# Patient Record
Sex: FEMALE | Race: WHITE | Employment: FULL TIME | URBAN - METROPOLITAN AREA
[De-identification: names, ages, dates, MRNs, and addresses within clinical notes are randomized per-mention and may not be internally consistent; named-entity substitution may affect disease eponyms.]

---

## 2021-08-10 ENCOUNTER — OFFICE VISIT (OUTPATIENT)
Dept: URGENT CARE | Facility: CLINIC | Age: 31
End: 2021-08-10
Payer: COMMERCIAL

## 2021-08-10 VITALS
DIASTOLIC BLOOD PRESSURE: 80 MMHG | HEART RATE: 88 BPM | BODY MASS INDEX: 27.31 KG/M2 | OXYGEN SATURATION: 99 % | RESPIRATION RATE: 16 BRPM | WEIGHT: 160 LBS | SYSTOLIC BLOOD PRESSURE: 115 MMHG | HEIGHT: 64 IN | TEMPERATURE: 98 F

## 2021-08-10 DIAGNOSIS — R05.9 COUGH: ICD-10-CM

## 2021-08-10 DIAGNOSIS — R09.81 NASAL CONGESTION: ICD-10-CM

## 2021-08-10 DIAGNOSIS — Z20.822 EXPOSURE TO COVID-19 VIRUS: Primary | ICD-10-CM

## 2021-08-10 LAB
CTP QC/QA: YES
SARS-COV-2 RDRP RESP QL NAA+PROBE: NEGATIVE

## 2021-08-10 PROCEDURE — 3079F DIAST BP 80-89 MM HG: CPT | Mod: CPTII,S$GLB,, | Performed by: PHYSICIAN ASSISTANT

## 2021-08-10 PROCEDURE — 3008F PR BODY MASS INDEX (BMI) DOCUMENTED: ICD-10-PCS | Mod: CPTII,S$GLB,, | Performed by: PHYSICIAN ASSISTANT

## 2021-08-10 PROCEDURE — 1160F RVW MEDS BY RX/DR IN RCRD: CPT | Mod: CPTII,S$GLB,, | Performed by: PHYSICIAN ASSISTANT

## 2021-08-10 PROCEDURE — U0002: ICD-10-PCS | Mod: QW,S$GLB,, | Performed by: PHYSICIAN ASSISTANT

## 2021-08-10 PROCEDURE — 1160F PR REVIEW ALL MEDS BY PRESCRIBER/CLIN PHARMACIST DOCUMENTED: ICD-10-PCS | Mod: CPTII,S$GLB,, | Performed by: PHYSICIAN ASSISTANT

## 2021-08-10 PROCEDURE — 99203 PR OFFICE/OUTPT VISIT, NEW, LEVL III, 30-44 MIN: ICD-10-PCS | Mod: S$GLB,,, | Performed by: PHYSICIAN ASSISTANT

## 2021-08-10 PROCEDURE — 3079F PR MOST RECENT DIASTOLIC BLOOD PRESSURE 80-89 MM HG: ICD-10-PCS | Mod: CPTII,S$GLB,, | Performed by: PHYSICIAN ASSISTANT

## 2021-08-10 PROCEDURE — 1159F MED LIST DOCD IN RCRD: CPT | Mod: CPTII,S$GLB,, | Performed by: PHYSICIAN ASSISTANT

## 2021-08-10 PROCEDURE — 99203 OFFICE O/P NEW LOW 30 MIN: CPT | Mod: S$GLB,,, | Performed by: PHYSICIAN ASSISTANT

## 2021-08-10 PROCEDURE — 3074F PR MOST RECENT SYSTOLIC BLOOD PRESSURE < 130 MM HG: ICD-10-PCS | Mod: CPTII,S$GLB,, | Performed by: PHYSICIAN ASSISTANT

## 2021-08-10 PROCEDURE — U0002 COVID-19 LAB TEST NON-CDC: HCPCS | Mod: QW,S$GLB,, | Performed by: PHYSICIAN ASSISTANT

## 2021-08-10 PROCEDURE — 3074F SYST BP LT 130 MM HG: CPT | Mod: CPTII,S$GLB,, | Performed by: PHYSICIAN ASSISTANT

## 2021-08-10 PROCEDURE — 1159F PR MEDICATION LIST DOCUMENTED IN MEDICAL RECORD: ICD-10-PCS | Mod: CPTII,S$GLB,, | Performed by: PHYSICIAN ASSISTANT

## 2021-08-10 PROCEDURE — 3008F BODY MASS INDEX DOCD: CPT | Mod: CPTII,S$GLB,, | Performed by: PHYSICIAN ASSISTANT

## 2021-08-10 RX ORDER — FLUOXETINE HYDROCHLORIDE 20 MG/1
20 CAPSULE ORAL DAILY
COMMUNITY

## 2022-11-11 ENCOUNTER — OFFICE VISIT (OUTPATIENT)
Dept: URGENT CARE | Facility: CLINIC | Age: 32
End: 2022-11-11
Payer: COMMERCIAL

## 2022-11-11 VITALS
TEMPERATURE: 99 F | WEIGHT: 150 LBS | BODY MASS INDEX: 24.99 KG/M2 | RESPIRATION RATE: 17 BRPM | OXYGEN SATURATION: 97 % | SYSTOLIC BLOOD PRESSURE: 116 MMHG | HEIGHT: 65 IN | DIASTOLIC BLOOD PRESSURE: 74 MMHG | HEART RATE: 90 BPM

## 2022-11-11 DIAGNOSIS — B96.89 ACUTE BACTERIAL TONSILLITIS: ICD-10-CM

## 2022-11-11 DIAGNOSIS — J03.80 ACUTE BACTERIAL TONSILLITIS: ICD-10-CM

## 2022-11-11 DIAGNOSIS — J02.9 SORE THROAT: Primary | ICD-10-CM

## 2022-11-11 LAB
CTP QC/QA: YES
MOLECULAR STREP A: NEGATIVE

## 2022-11-11 PROCEDURE — 3078F PR MOST RECENT DIASTOLIC BLOOD PRESSURE < 80 MM HG: ICD-10-PCS | Mod: CPTII,S$GLB,, | Performed by: INTERNAL MEDICINE

## 2022-11-11 PROCEDURE — 3008F PR BODY MASS INDEX (BMI) DOCUMENTED: ICD-10-PCS | Mod: CPTII,S$GLB,, | Performed by: INTERNAL MEDICINE

## 2022-11-11 PROCEDURE — 99214 PR OFFICE/OUTPT VISIT, EST, LEVL IV, 30-39 MIN: ICD-10-PCS | Mod: S$GLB,,, | Performed by: INTERNAL MEDICINE

## 2022-11-11 PROCEDURE — 3078F DIAST BP <80 MM HG: CPT | Mod: CPTII,S$GLB,, | Performed by: INTERNAL MEDICINE

## 2022-11-11 PROCEDURE — 99214 OFFICE O/P EST MOD 30 MIN: CPT | Mod: S$GLB,,, | Performed by: INTERNAL MEDICINE

## 2022-11-11 PROCEDURE — 3008F BODY MASS INDEX DOCD: CPT | Mod: CPTII,S$GLB,, | Performed by: INTERNAL MEDICINE

## 2022-11-11 PROCEDURE — 3074F PR MOST RECENT SYSTOLIC BLOOD PRESSURE < 130 MM HG: ICD-10-PCS | Mod: CPTII,S$GLB,, | Performed by: INTERNAL MEDICINE

## 2022-11-11 PROCEDURE — 87651 POCT STREP A MOLECULAR: ICD-10-PCS | Mod: QW,S$GLB,, | Performed by: INTERNAL MEDICINE

## 2022-11-11 PROCEDURE — 87651 STREP A DNA AMP PROBE: CPT | Mod: QW,S$GLB,, | Performed by: INTERNAL MEDICINE

## 2022-11-11 PROCEDURE — 3074F SYST BP LT 130 MM HG: CPT | Mod: CPTII,S$GLB,, | Performed by: INTERNAL MEDICINE

## 2022-11-11 RX ORDER — AZITHROMYCIN 250 MG/1
TABLET, FILM COATED ORAL
Qty: 6 TABLET | Refills: 0 | Status: SHIPPED | OUTPATIENT
Start: 2022-11-11 | End: 2022-11-16

## 2022-11-11 NOTE — PROGRESS NOTES
"Subjective:       Patient ID: Jayleen Gold is a 31 y.o. female.    Vitals:  height is 5' 5" (1.651 m) and weight is 68 kg (150 lb). Her oral temperature is 99.1 °F (37.3 °C). Her blood pressure is 116/74 and her pulse is 90. Her respiration is 17 and oxygen saturation is 97%.     Chief Complaint: Sore Throat and Otalgia (/)    Pt states right sided sore throat and right ear pain x 2 days.    Sore Throat   This is a new problem. The current episode started in the past 7 days. The problem has been gradually worsening. The pain is worse on the right side. There has been no fever. The pain is at a severity of 6/10. Associated symptoms include ear pain. Pertinent negatives include no abdominal pain, congestion, coughing, diarrhea, drooling, ear discharge, headaches, hoarse voice, plugged ear sensation, neck pain, shortness of breath, stridor, swollen glands, trouble swallowing or vomiting. She has tried NSAIDs for the symptoms. The treatment provided mild relief.   Otalgia   Associated symptoms include a sore throat. Pertinent negatives include no abdominal pain, coughing, diarrhea, ear discharge, headaches, neck pain or vomiting.     HENT:  Positive for ear pain and sore throat. Negative for ear discharge, drooling, congestion and trouble swallowing.    Neck: Negative for neck pain.   Respiratory:  Negative for cough, shortness of breath and stridor.    Gastrointestinal:  Negative for abdominal pain, vomiting and diarrhea.   Skin:  Negative for erythema.   Neurological:  Negative for headaches.     Objective:      Physical Exam   Constitutional: She is oriented to person, place, and time. She appears well-developed. No distress.   HENT:   Head: Normocephalic and atraumatic.   Ears:   Right Ear: External ear normal.   Left Ear: External ear normal.   Nose: Nose normal.   Mouth/Throat: Oropharyngeal exudate present. Tonsils are 2+ on the right. Tonsils are 1+ on the left. Tonsillar exudate.       Eyes: Conjunctivae " and EOM are normal. Pupils are equal, round, and reactive to light. Right eye exhibits no discharge. Left eye exhibits no discharge. No scleral icterus.   Neck: Neck supple.   Cardiovascular: Normal rate, regular rhythm and normal heart sounds.   No murmur heard.Exam reveals no gallop and no friction rub.   Pulmonary/Chest: Effort normal. No respiratory distress. She has no wheezes. She has no rales.   Abdominal: Bowel sounds are normal. She exhibits no distension. Soft.   Lymphadenopathy:     She has cervical adenopathy.   Neurological: She is alert and oriented to person, place, and time.   Skin: Skin is warm, dry, not diaphoretic and no rash. Capillary refill takes less than 2 seconds. No erythema   Psychiatric: Her behavior is normal.   Nursing note and vitals reviewed.      Assessment:       1. Sore throat    2. Acute bacterial tonsillitis          Plan:         Sore throat  -     POCT Strep A, Molecular    Acute bacterial tonsillitis  -     azithromycin (Z-BRANDY) 250 MG tablet; Take 2 tablets by mouth on day 1; Take 1 tablet by mouth on days 2-5  Dispense: 6 tablet; Refill: 0

## 2022-11-12 ENCOUNTER — CLINICAL SUPPORT (OUTPATIENT)
Dept: URGENT CARE | Facility: CLINIC | Age: 32
End: 2022-11-12
Payer: COMMERCIAL

## 2022-11-12 VITALS
WEIGHT: 150 LBS | DIASTOLIC BLOOD PRESSURE: 83 MMHG | HEART RATE: 63 BPM | BODY MASS INDEX: 24.99 KG/M2 | SYSTOLIC BLOOD PRESSURE: 121 MMHG | TEMPERATURE: 98 F | RESPIRATION RATE: 20 BRPM | OXYGEN SATURATION: 97 % | HEIGHT: 65 IN

## 2022-11-12 DIAGNOSIS — J30.9 CHRONIC ALLERGIC RHINITIS: ICD-10-CM

## 2022-11-12 DIAGNOSIS — Z11.59 ENCOUNTER FOR SCREENING FOR VIRAL DISEASE: ICD-10-CM

## 2022-11-12 DIAGNOSIS — J03.90 EXUDATIVE TONSILLITIS: Primary | ICD-10-CM

## 2022-11-12 DIAGNOSIS — R50.81 FEVER IN OTHER DISEASES: ICD-10-CM

## 2022-11-12 DIAGNOSIS — J02.8 ACUTE BACTERIAL PHARYNGITIS: ICD-10-CM

## 2022-11-12 DIAGNOSIS — B96.89 ACUTE BACTERIAL PHARYNGITIS: ICD-10-CM

## 2022-11-12 DIAGNOSIS — J02.9 SORE THROAT: ICD-10-CM

## 2022-11-12 LAB
CTP QC/QA: YES
MOLECULAR STREP A: NEGATIVE
POC MOLECULAR INFLUENZA A AGN: NEGATIVE
POC MOLECULAR INFLUENZA B AGN: NEGATIVE
SARS-COV-2 AG RESP QL IA.RAPID: NEGATIVE

## 2022-11-12 PROCEDURE — 87811 SARS CORONAVIRUS 2 ANTIGEN POCT, MANUAL READ: ICD-10-PCS | Mod: QW,S$GLB,, | Performed by: PHYSICIAN ASSISTANT

## 2022-11-12 PROCEDURE — 99214 PR OFFICE/OUTPT VISIT, EST, LEVL IV, 30-39 MIN: ICD-10-PCS | Mod: S$GLB,,, | Performed by: PHYSICIAN ASSISTANT

## 2022-11-12 PROCEDURE — 87651 STREP A DNA AMP PROBE: CPT | Mod: QW,S$GLB,, | Performed by: PHYSICIAN ASSISTANT

## 2022-11-12 PROCEDURE — 87502 POCT INFLUENZA A/B MOLECULAR: ICD-10-PCS | Mod: QW,S$GLB,, | Performed by: PHYSICIAN ASSISTANT

## 2022-11-12 PROCEDURE — 87651 POCT STREP A MOLECULAR: ICD-10-PCS | Mod: QW,S$GLB,, | Performed by: PHYSICIAN ASSISTANT

## 2022-11-12 PROCEDURE — 99214 OFFICE O/P EST MOD 30 MIN: CPT | Mod: S$GLB,,, | Performed by: PHYSICIAN ASSISTANT

## 2022-11-12 PROCEDURE — 87811 SARS-COV-2 COVID19 W/OPTIC: CPT | Mod: QW,S$GLB,, | Performed by: PHYSICIAN ASSISTANT

## 2022-11-12 PROCEDURE — 87502 INFLUENZA DNA AMP PROBE: CPT | Mod: QW,S$GLB,, | Performed by: PHYSICIAN ASSISTANT

## 2022-11-12 RX ORDER — AMOXICILLIN AND CLAVULANATE POTASSIUM 875; 125 MG/1; MG/1
1 TABLET, FILM COATED ORAL EVERY 12 HOURS
Qty: 14 TABLET | Refills: 0 | Status: SHIPPED | OUTPATIENT
Start: 2022-11-12 | End: 2022-11-19

## 2022-11-12 NOTE — PROGRESS NOTES
"Subjective:       Patient ID: Jayleen Gold is a 31 y.o. female.    Vitals:  height is 5' 5" (1.651 m) and weight is 68 kg (150 lb). Her temperature is 98.2 °F (36.8 °C). Her blood pressure is 121/83 and her pulse is 63. Her respiration is 20 and oxygen saturation is 97%.     Chief Complaint: Sore Throat    31-year-old female was already vaccinated for COVID-19 presents urgent care clinic for evaluation.  She was seen at our [facility] by different provider yesterday.  At that time strep a was negative.  She was prescribed Z-Bill.  She was complaining of swollen lymph nodes, sore throat, and right ear pain set started Thursday.  Was found to have fluid in right ear but no infection.  Reports symptoms have progressively worsened and developed fever last night 100.4.  The pulse on right side of her tonsils has now moved to her left side as well.  Pain is rated as 10/10 at its worst when not taking ibuprofen and 7/10 with ibuprofen.  Pain worsens with swallowing but no difficulty managing airway or saliva.  She is requesting repeat strep test because she did not feel that it was a good sample yesterday.  She is also requesting flu and COVID testing since her son is in  and does not wish to get him sick.  Has chronic allergies with nasal congestion and runny nose but has not taking anything for this.      Medical assistant note:    Pt reports a sore throat that started Thursday night and has progressively gotten worse. She went to [facility] and was treated with a Z-pack which she said she does not want. Her son started  in August and is constantly coming home with something. She last had ibuprofen at 9am this morning and states her tonsils now have white patches on both sides whereas yesterday it was just 1 side.     Sore Throat   This is a new problem. The current episode started in the past 7 days. The problem has been gradually worsening. There has been no fever. The pain is at a " severity of 10/10. Associated symptoms include congestion. Pertinent negatives include no abdominal pain, coughing, diarrhea, ear pain, headaches, neck pain, shortness of breath, stridor, trouble swallowing or vomiting. She has tried NSAIDs for the symptoms. The treatment provided mild relief.     Constitution: Positive for fatigue and fever. Negative for activity change, appetite change, chills, sweating and generalized weakness.   HENT:  Positive for congestion, postnasal drip and sore throat. Negative for ear pain, hearing loss, facial swelling, sinus pain, sinus pressure, trouble swallowing and voice change.    Neck: Negative for neck pain, neck stiffness and painful lymph nodes.   Cardiovascular:  Negative for chest pain, leg swelling, palpitations, sob on exertion and passing out.   Eyes:  Negative for eye discharge, eye pain, photophobia, vision loss, double vision and blurred vision.   Respiratory:  Negative for chest tightness, cough, sputum production, bloody sputum, COPD, shortness of breath, stridor, wheezing and asthma.    Gastrointestinal:  Negative for abdominal pain, nausea, vomiting, constipation, diarrhea, bright red blood in stool, rectal bleeding, heartburn and bowel incontinence.   Genitourinary:  Negative for dysuria, frequency, urgency, urine decreased, flank pain, bladder incontinence and hematuria.   Musculoskeletal:  Negative for trauma, joint pain, joint swelling, abnormal ROM of joint, muscle cramps and muscle ache.   Skin:  Negative for color change, pale, rash and wound.   Allergic/Immunologic: Positive for seasonal allergies. Negative for asthma and immunocompromised state.   Neurological:  Negative for dizziness, history of vertigo, light-headedness, passing out, facial drooping, speech difficulty, coordination disturbances, loss of balance, headaches, disorientation, altered mental status, loss of consciousness, numbness, tingling and seizures.   Hematologic/Lymphatic: Negative for  swollen lymph nodes, easy bruising/bleeding and trouble clotting. Does not bruise/bleed easily.   Psychiatric/Behavioral:  Negative for altered mental status and disorientation.        Past Medical History:   Diagnosis Date    Allergy     Depression        Objective:      Physical Exam   Constitutional: She is oriented to person, place, and time. She appears well-developed. She is cooperative.  Non-toxic appearance. She does not appear ill. No distress.   HENT:   Head: Normocephalic and atraumatic.   Ears:   Right Ear: Hearing, external ear and ear canal normal. No no drainage, swelling or tenderness.   Left Ear: Hearing, external ear and ear canal normal. No no drainage, swelling or tenderness.   Nose: Nose normal. No rhinorrhea or purulent discharge. Right sinus exhibits no maxillary sinus tenderness and no frontal sinus tenderness. Left sinus exhibits no maxillary sinus tenderness and no frontal sinus tenderness.   Mouth/Throat: Uvula is midline and mucous membranes are normal. Mucous membranes are moist. No oral lesions. No trismus in the jaw. No uvula swelling. Oropharyngeal exudate, posterior oropharyngeal edema, posterior oropharyngeal erythema and cobblestoning present. No tonsillar abscesses. Tonsils are 1+ on the right. Tonsils are 1+ on the left. Tonsillar exudate.   Eyes: Conjunctivae, EOM and lids are normal. Pupils are equal, round, and reactive to light. No visual field deficit is present. Right eye exhibits no discharge. Left eye exhibits no discharge. Right conjunctiva is not injected. Right conjunctiva has no hemorrhage. Left conjunctiva is not injected. Left conjunctiva has no hemorrhage. Extraocular movement intact vision grossly intact gaze aligned appropriately   Neck: Neck supple. No neck rigidity present.   Cardiovascular: Normal rate, regular rhythm, normal heart sounds and normal pulses.   No murmur heard.  Pulmonary/Chest: Effort normal and breath sounds normal. No accessory muscle usage  or stridor. No respiratory distress. She has no wheezes. She exhibits no tenderness.   Abdominal: Normal appearance. She exhibits no distension and no mass. Soft. There is no abdominal tenderness. There is no rebound and no guarding.   Musculoskeletal: Normal range of motion.         General: Normal range of motion.      Right lower leg: No edema.      Left lower leg: No edema.      Comments: Moves all extremities with normal tone, strength, and ROM.  Gait normal.   Lymphadenopathy:     She has cervical adenopathy.        Right cervical: Superficial cervical adenopathy present.        Left cervical: No superficial cervical adenopathy present.   Neurological: no focal deficit. She is alert, oriented to person, place, and time and at baseline. She has normal motor skills and normal sensation. She displays no weakness, facial symmetry, normal reflexes and no dysarthria. No cranial nerve deficit or sensory deficit. She exhibits normal muscle tone. She has a normal Finger-Nose-Finger Test. Coordination: Heel to shin test normal. She shows no pronator drift. She displays no seizure activity. Gait and coordination normal. Coordination normal. GCS eye subscore is 4. GCS verbal subscore is 5. GCS motor subscore is 6.   Skin: Skin is warm, dry, not diaphoretic and no rash. Capillary refill takes less than 2 seconds.   Psychiatric: Her speech is normal and behavior is normal. Thought content normal.   Nursing note and vitals reviewed.        Results for orders placed or performed in visit on 11/12/22   POCT Influenza A/B MOLECULAR   Result Value Ref Range    POC Molecular Influenza A Ag Negative Negative, Not Reported    POC Molecular Influenza B Ag Negative Negative, Not Reported     Acceptable Yes    POCT Strep A, Molecular   Result Value Ref Range    Molecular Strep A, POC Negative Negative     Acceptable Yes    SARS Coronavirus 2 Antigen, POCT Manual Read   Result Value Ref Range    SARS  Coronavirus 2 Antigen Negative Negative     Acceptable Yes        Assessment:       1. Exudative tonsillitis    2. Acute bacterial pharyngitis    3. Sore throat    4. Fever in other diseases    5. Chronic allergic rhinitis    6. Encounter for screening for viral disease       Nontoxic appearing. Vitals are stable. Rapid covid, flu, and strep A negative.   All diagnostic testing personally reviewed and interpreted.   Patient has symptoms at this time which is consistent with above diagnosis.        Patient was recommended OTC treatments for their chronic allergic rhinitis/allergies symptoms.  Zyrtec/Claritin daily, Flonase twice daily, Sudafed as needed, and Tylenol/ibuprofen for pain.    Patient was also prescribed medications for their symptoms.   Patient does not wish to take Z-Bill.  She has no penicillin allergy.  She does have exudate of tonsillitis, fever, significant swelling and posterior oropharynx consistent with bacterial pharyngitis.  Discussed Augmentin course for 7 days.  Patient will try OTC treatment for 2 days if does not improve then she will start Augmentin course.     Patient was counseled, explained with the test results meaning, expected course, and answered all of questions. They can also receive results via my chart.  Printed and verbal treatment guidelines/recommendations were given.   Recommend follow-up PCP in the next 2-3 days if new or worsening symptoms.    Patient understands that they received an Urgent Care treatment only and that they may be released before all your medical problems are known or treated. Strict ED versus clinic precautions given.  Patient verbalized understanding and agreed with plan of care.    Note dictated with voice recognition software, please excuse any grammatical errors.       Plan:         Exudative tonsillitis  -     amoxicillin-clavulanate 875-125mg (AUGMENTIN) 875-125 mg per tablet; Take 1 tablet by mouth every 12 (twelve) hours. for 7 days   Dispense: 14 tablet; Refill: 0  -     diphenhydrAMINE-aluminum-magnesium hydroxide-simethicone-LIDOcaine HCl 2%; Swish and spit 15 mLs every 6 (six) hours as needed (sore throat).  Dispense: 220 each; Refill: 0    Acute bacterial pharyngitis  -     diphenhydrAMINE-aluminum-magnesium hydroxide-simethicone-LIDOcaine HCl 2%; Swish and spit 15 mLs every 6 (six) hours as needed (sore throat).  Dispense: 220 each; Refill: 0    Sore throat  -     Cancel: SARS Coronavirus 2 Antigen, POCT Manual Read  -     POCT Influenza A/B MOLECULAR  -     POCT Strep A, Molecular  -     SARS Coronavirus 2 Antigen, POCT Manual Read  -     diphenhydrAMINE-aluminum-magnesium hydroxide-simethicone-LIDOcaine HCl 2%; Swish and spit 15 mLs every 6 (six) hours as needed (sore throat).  Dispense: 220 each; Refill: 0    Fever in other diseases    Chronic allergic rhinitis    Encounter for screening for viral disease            Additional MDM:     Heart Failure Score:   COPD = No  Patient Instructions     PLEASE READ YOUR DISCHARGE INSTRUCTIONS ENTIRELY AS IT CONTAINS IMPORTANT INFORMATION.    Patient had covid testing done today.    Discussed corona virus precautions and reviewed CDC FAC; printed a copy for patient.  I discussed to continue to monitor their symptoms. Discussed that if their symptoms persist or worsen to seek re-evaluation. Clinic vs. ER precautions were given.  Patient verbalized understanding and agreed with the entire plan of care.    If Negative and no direct exposure: symptom free without fever reducing meds in 24 hours - can go back to work in 24 hours with surgical mask for 10-14 days.    - Reviewed radiographs and all diagnostic testing with patient/family.    - Rest.  Drink plenty of fluids.    - Tylenol OR anti-inflammatory (NSAIDs, ibuprofen, aleve, motrin) as directed as needed for fever/pain.  For Tylenol, do not exceed 3000 mg/ day. If no contraindication or allergies.  -OK to supplement with OTC DayQuil, NyQuil or  TheraFlu every 6 hours as needed for cough and congestion.  Use caution of total amount of Tylenol/acetaminophen per day.    - If you were prescribed antibiotics, please take them to completion. Please supplement with OTC probiotics and yogurt.  Contact clinic if develop profuse diarrhea and weakness.  - If you are female and on birth control pills - please use additional methods of contraception to prevent pregnancy while on antibiotics and for one cycle after.     -Below are suggestions for symptomatic relief:              -Salt water gargles to soothe throat pain.              -Chloroseptic spray also helps to numb throat pain.              -Nasal saline spray reduces inflammation and dryness.              -Warm face compresses to help with facial sinus pain/pressure.              -Vicks vapor rub at night.             **may also supplement with OTC nasal spray to help with inflammation and congestion.   Wean to off when you nose becomes to dry or bleed. Also use nasal saline twice a day to help with dryness.               -Flonase OTC or Nasacort OTC  once or twice a day for nasal/sinus congestion. DON'T USE IF YOU HAVE GLAUCOMA. CHECK WITH YOUR PHARMACIST/PHYSICIAN.              -Simple foods like chicken noodle soup.              -Mucinex DM (ANY COUGH EXPECTORANT-- guaifenesin) for cough or chest congestion with mucus during the day time. Delsym or robitussin (ANY COUGH SUPPRESSANT- dextromethorphan) helps with coughing at night. Mucinex-DM if you have chest congestion or sputum (caution if history of high blood pressure or palpitations).              -Zyrtec/Claritin/xyzal during the day time  & Benadryl at night (only if severe runny nose) may help with allergies and runny nose. Add decongestant if you have nasal/sinus congestion/sinus pressure/ear fullness sensation. (see below)              -may take OTC meclizine as needed for dizziness or nausea.     Caution with use of Decongestant meds:  -If you DO  NOT have Hypertension or any history of palpitations, it is ok to take over the counter Sudafed or Mucinex D or Allegra-D or Claritin-D or Zyrtec-D.  -If you do take one of the above, it is ok to combine that with plain over the counter Mucinex or Allegra or Claritin or Zyrtec. If, for example, you are taking Zyrtec -D, you can combine that with Mucinex, but not Mucinex-D.  If you are taking Mucinex-D, you can combine that with plain Allegra or Claritin or Zyrtec.     -Do not combine pseudophed or phenylephrine with any other brand allergy-D for DECONGESTANT.   -Or vice versa, you can you take plain allergy medications (allegra/claritin/zyrtec with NO Decongestant) and ADD OTC pseudophed or phenelyphrine 2-3 times a day (or every 4-6 hours needed). Avoid taking decongestant late at night or with caffeine as it can keep you up or cause jittery feeling.     -If you DO have Hypertension , anxiety, or palpitations, it is safe to take Coricidin HBP for relief of cough, congestion, or sinus symptoms.      For your GI symptoms:  -Use gatorade/pedialyte or rehydration packets to help stay hydrated. Vitamin water and plain water do not contain rehydrating electrolytes.  -Increase clear liquids (water, gatorade, pedialyte, broths, jello, etc) Hold off on solids for 12-18 hours. Then advance to BRAT diet (banana, rice, applesauce, tea, toast/crackers), then advance further as tolerated. Avoid spicy or fatty foods.   -May take Emitrol OTC as needed for nausea.   -Use Peptobismol or Immodium to help alleviate your diarrhea symptoms.   -Take mylanta or simethicone for bloating or gas pain.   -Take pepcid or omeprazole if you have heartburn or reflux sensation.  -Avoid imodium unless you have more than 6 loose stools in 24 hours. Take 1 dose and monitor to see if you can repeat AS IT WILL CAUSE CONSTIPATION.  -Wash hands frequently while sick. Avoid ibuprofen or other NSAIDS until you are well.   -Please go to the ER if you  experience worsening abdominal pain, blood in your vomit or stool, high fever, dizziness, fainting, swelling of your abdomen, inability to pass gas or stool, or inability to urinate.     -You must understand that you've received an Urgent Care treatment only and that you may be released before all your medical problems are known or treated. You, the patient, will arrange for follow up care as instructed. Please arrange follow up with your primary medical clinic within 2-5 days if your signs and symptoms have not resolved or worsen.     - Follow up with your PCP or specialty clinic as directed.  You can call [phone] or [phone] to schedule an appointment with the appropriate provider.  Schedule [facility]ENTER is open Mon-Friday 8-5pm (excluded holidays).    - If your condition worsens or fails to improve we recommend that you receive another evaluation at the emergency room immediately or contact your primary medical clinic to discuss your concerns.            Prevention steps for patients with confirmed or suspected COVID-19  Stay home and stay away from family members and friends. The CDC says, you can leave home after these three things have happened: 1) You have had no fever for at least 24 hours (that is one full day of no fever without the use of medicine that reduces fevers) 2) AND other symptoms have improved (for example, when your cough or shortness of breath have improved) 3) AND at least 10 days have passed since your symptoms first appeared OR after 6-10 days passed from first positive test (with mask on till day #11).  Separate yourself from other people and animals in your home.  Call ahead before visiting your doctor.  Wear a facemask.  Cover your coughs and sneezes.  Wash your hands often with soap and water; hand  can be used, too.  Avoid sharing personal household items.  Wipe down surfaces used daily.  Monitor your symptoms. Seek prompt medical attention if your illness is worsening  (e.g., difficulty breathing).   Before seeking care, call your healthcare provider.  If you have a medical emergency and need to call 911, notify the dispatch personnel that you have, or are being evaluated for COVID-19. If possible, put on a facemask before emergency medical services arrive.      Recommended precautions for household members, intimate partners, and caregivers in a home setting of a patient with symptomatic laboratory-confirmed COVID-19 or a patient under investigation.  Household members, intimate partners, and caregivers in the home setting awaiting tests results have close contact with a person with symptomatic, laboratory-confirmed COVID-19 or a person under investigation. Close contacts should monitor their health; they should call their provider right away if they develop symptoms suggestive of COVID-19 (e.g., fever, cough, shortness of breath).    Close contacts should also follow these recommendations:  Make sure that you understand and can help the patient follow their provider's instructions for medication(s) and care. You should help the patient with basic needs in the home and provide support for getting groceries, prescriptions, and other personal needs.  Monitor the patient's symptoms. If the patient is getting sicker, call his or her healthcare provider and tell them that the patient has laboratory-confirmed COVID-19. If the patient has a medical emergency and you need to call 911, notify the dispatch personnel that the patient has, or is being evaluated for COVID-19.  Household members should stay in another room or be  from the patient. Household members should use a separate bedroom and bathroom, if available.  Prohibit visitors.  Household members should care for any pets in the home.  Make sure that shared spaces in the home have good air flow, such as by an air conditioner or an opened window, weather permitting.  Perform hand hygiene frequently. Wash your hands often  with soap and water for at least 20 seconds or use an alcohol-based hand  (that contains > 60% alcohol) covering all surfaces of your hands and rubbing them together until they feel dry. Soap and water should be used preferentially.  Avoid touching your eyes, nose, and mouth.  The patient should wear a facemask. If the patient is not able to wear a facemask (for example, because it causes trouble breathing), caregivers should wear a mask when they are in the same room as the patient.  Wear a disposable facemask and gloves when you touch or have contact with the patient's blood, stool, or body fluids, such as saliva, sputum, nasal mucus, vomit, urine.  Throw out disposable facemasks and gloves after using them. Do not reuse.  When removing personal protective equipment, first remove and dispose of gloves. Then, immediately clean your hands with soap and water or alcohol-based hand . Next, remove and dispose of facemask, and immediately clean your hands again with soap and water or alcohol-based hand .  You should not share dishes, drinking glasses, cups, eating utensils, towels, bedding, or other items with the patient. After the patient uses these items, you should wash them thoroughly (see below Wash laundry thoroughly).  Clean all high-touch surfaces, such as counters, tabletops, doorknobs, bathroom fixtures, toilets, phones, keyboards, tablets, and bedside tables, every day. Also, clean any surfaces that may have blood, stool, or body fluids on them.  Use a household cleaning spray or wipe, according to the label instructions. Labels contain instructions for safe and effective use of the cleaning product including precautions you should take when applying the product, such as wearing gloves and making sure you have good ventilation during use of the product.  Wash laundry thoroughly.  Immediately remove and wash clothes or bedding that have blood, stool, or body fluids on  them.  Wear disposable gloves while handling soiled items and keep soiled items away from your body. Clean your hands (with soap and water or an alcohol-based hand ) immediately after removing your gloves.  Read and follow directions on labels of laundry or clothing items and detergent. In general, using a normal laundry detergent according to washing machine instructions and dry thoroughly using the warmest temperatures recommended on the clothing label.  Place all used disposable gloves, facemasks, and other contaminated items in a lined container before disposing of them with other household waste. Clean your hands (with soap and water or an alcohol-based hand ) immediately after handling these items. Soap and water should be used preferentially if hands are visibly dirty.  Discuss any additional questions with your state or local health department or healthcare provider. Check available hours when contacting your local health department.    For more information see CDC link below.      https://www.cdc.gov/coronavirus/2019-ncov/hcp/guidance-prevent-spread.html#precautions        Sources:  [facility], [facility]          Instructions for Home Care of Patients and Caretakers with Coronavirus Disease 2019  Limit visitors to the home.  Older persons and those that have chronic medical conditions such as diabetes, lung and heart disease are at increased risk for illness.   If possible, patients should use a separate bedroom while recovering. Caregivers and household members should avoid prolonged contact with the patient which means to stay 6 feet away and avoid contact with cough droplets.  When close contact is necessary, wash your hands before and immediately after contact.   Perform hand hygiene frequently. Wash your hands often with soap and water for at least 20 seconds or use an alcohol-based hand , covering all surfaces of your hands and rubbing them  together until they feel dry.   Avoid touching your eyes, nose, and mouth with unwashed hands.  Avoid sharing household items with the patient. You should not share dishes, drinking glasses, cups, eating utensils, towels, bedding, or other items. After the patient uses these items, you should wash them thoroughly.  Wash laundry thoroughly.   Immediately remove and wash clothes or bedding that have blood, stool, or body fluids on them.  Clean all high-touch surfaces, such as counters, tabletops, doorknobs, bathroom fixtures, toilets, phones, keyboards, tablets, and bedside tables, every day.   Use a household cleaning spray or wipe, according to the label instructions. Labels contain instructions for safe and effective use of the cleaning product including precautions you should take when applying the product, such as wearing gloves and making sure you have good ventilation during use of the product.    For more information see CDC link below.      https://www.cdc.gov/coronavirus/2019-ncov/hcp/guidance-prevent-spread.html#precautions               If your symptoms worsen or if you have any other concerns, please contact [facility] at [phone].

## 2022-11-12 NOTE — PROGRESS NOTES
"Subjective:       Patient ID: Jayleen Gold is a 31 y.o. female.    Vitals:  height is 5' 5" (1.651 m) and weight is 68 kg (150 lb).     Chief Complaint: Sore Throat    Pt reports sore throat that started    Sore Throat     HENT:  Positive for sore throat.      Objective:      Physical Exam      Assessment:       No diagnosis found.      Plan:         There are no diagnoses linked to this encounter.                 "

## 2022-11-12 NOTE — PATIENT INSTRUCTIONS
PLEASE READ YOUR DISCHARGE INSTRUCTIONS ENTIRELY AS IT CONTAINS IMPORTANT INFORMATION.    Patient had covid testing done today.    Discussed corona virus precautions and reviewed [facility] FAC; printed a copy for patient.  I discussed to continue to monitor their symptoms. Discussed that if their symptoms persist or worsen to seek re-evaluation. Clinic vs. ER precautions were given.  Patient verbalized understanding and agreed with the entire plan of care.    If Negative and no direct exposure: symptom free without fever reducing meds in 24 hours - can go back to work in 24 hours with surgical mask for 10-14 days.    - Reviewed radiographs and all diagnostic testing with patient/family.    - Rest.  Drink plenty of fluids.    - Tylenol OR anti-inflammatory (NSAIDs, ibuprofen, aleve, motrin) as directed as needed for fever/pain.  For Tylenol, do not exceed 3000 mg/ day. If no contraindication or allergies.  -OK to supplement with OTC DayQuil, NyQuil or TheraFlu every 6 hours as needed for cough and congestion.  Use caution of total amount of Tylenol/acetaminophen per day.    - If you were prescribed antibiotics, please take them to completion. Please supplement with OTC probiotics and yogurt.  Contact clinic if develop profuse diarrhea and weakness.  - If you are female and on birth control pills - please use additional methods of contraception to prevent pregnancy while on antibiotics and for one cycle after.     -Below are suggestions for symptomatic relief:              -Salt water gargles to soothe throat pain.              -Chloroseptic spray also helps to numb throat pain.              -Nasal saline spray reduces inflammation and dryness.              -Warm face compresses to help with facial sinus pain/pressure.              -Vicks vapor rub at night.             **may also supplement with OTC nasal spray to help with inflammation and congestion.   Wean to off when you nose becomes to dry or bleed. Also use nasal  saline twice a day to help with dryness.               -Flonase OTC or Nasacort OTC  once or twice a day for nasal/sinus congestion. DON'T USE IF YOU HAVE GLAUCOMA. CHECK WITH YOUR PHARMACIST/PHYSICIAN.              -Simple foods like chicken noodle soup.              -Mucinex DM (ANY COUGH EXPECTORANT-- guaifenesin) for cough or chest congestion with mucus during the day time. Delsym or robitussin (ANY COUGH SUPPRESSANT- dextromethorphan) helps with coughing at night. Mucinex-DM if you have chest congestion or sputum (caution if history of high blood pressure or palpitations).              -Zyrtec/Claritin/xyzal during the day time  & Benadryl at night (only if severe runny nose) may help with allergies and runny nose. Add decongestant if you have nasal/sinus congestion/sinus pressure/ear fullness sensation. (see below)              -may take OTC meclizine as needed for dizziness or nausea.     Caution with use of Decongestant meds:  -If you DO NOT have Hypertension or any history of palpitations, it is ok to take over the counter Sudafed or Mucinex D or Allegra-D or Claritin-D or Zyrtec-D.  -If you do take one of the above, it is ok to combine that with plain over the counter Mucinex or Allegra or Claritin or Zyrtec. If, for example, you are taking Zyrtec -D, you can combine that with Mucinex, but not Mucinex-D.  If you are taking Mucinex-D, you can combine that with plain Allegra or Claritin or Zyrtec.     -Do not combine pseudophed or phenylephrine with any other brand allergy-D for DECONGESTANT.   -Or vice versa, you can you take plain allergy medications (allegra/claritin/zyrtec with NO Decongestant) and ADD OTC pseudophed or phenelyphrine 2-3 times a day (or every 4-6 hours needed). Avoid taking decongestant late at night or with caffeine as it can keep you up or cause jittery feeling.     -If you DO have Hypertension , anxiety, or palpitations, it is safe to take Coricidin HBP for relief of cough,  congestion, or sinus symptoms.      For your GI symptoms:  -Use gatorade/pedialyte or rehydration packets to help stay hydrated. Vitamin water and plain water do not contain rehydrating electrolytes.  -Increase clear liquids (water, gatorade, pedialyte, broths, jello, etc) Hold off on solids for 12-18 hours. Then advance to BRAT diet (banana, rice, applesauce, tea, toast/crackers), then advance further as tolerated. Avoid spicy or fatty foods.   -May take Emitrol OTC as needed for nausea.   -Use Peptobismol or Immodium to help alleviate your diarrhea symptoms.   -Take mylanta or simethicone for bloating or gas pain.   -Take pepcid or omeprazole if you have heartburn or reflux sensation.  -Avoid imodium unless you have more than 6 loose stools in 24 hours. Take 1 dose and monitor to see if you can repeat AS IT WILL CAUSE CONSTIPATION.  -Wash hands frequently while sick. Avoid ibuprofen or other NSAIDS until you are well.   -Please go to the ER if you experience worsening abdominal pain, blood in your vomit or stool, high fever, dizziness, fainting, swelling of your abdomen, inability to pass gas or stool, or inability to urinate.     -You must understand that you've received an Urgent Care treatment only and that you may be released before all your medical problems are known or treated. You, the patient, will arrange for follow up care as instructed. Please arrange follow up with your primary medical clinic within 2-5 days if your signs and symptoms have not resolved or worsen.     - Follow up with your PCP or specialty clinic as directed.  You can call [phone] or [phone] to schedule an appointment with the appropriate provider.  Schedule [facility] is open Mon-Friday 8-5pm (excluded holidays).    - If your condition worsens or fails to improve we recommend that you receive another evaluation at the emergency room immediately or contact your primary medical clinic to discuss your concerns.             Prevention steps for patients with confirmed or suspected COVID-19  Stay home and stay away from family members and friends. The CDC says, you can leave home after these three things have happened: 1) You have had no fever for at least 24 hours (that is one full day of no fever without the use of medicine that reduces fevers) 2) AND other symptoms have improved (for example, when your cough or shortness of breath have improved) 3) AND at least 10 days have passed since your symptoms first appeared OR after 6-10 days passed from first positive test (with mask on till day #11).  Separate yourself from other people and animals in your home.  Call ahead before visiting your doctor.  Wear a facemask.  Cover your coughs and sneezes.  Wash your hands often with soap and water; hand  can be used, too.  Avoid sharing personal household items.  Wipe down surfaces used daily.  Monitor your symptoms. Seek prompt medical attention if your illness is worsening (e.g., difficulty breathing).   Before seeking care, call your healthcare provider.  If you have a medical emergency and need to call 911, notify the dispatch personnel that you have, or are being evaluated for COVID-19. If possible, put on a facemask before emergency medical services arrive.      Recommended precautions for household members, intimate partners, and caregivers in a home setting of a patient with symptomatic laboratory-confirmed COVID-19 or a patient under investigation.  Household members, intimate partners, and caregivers in the home setting awaiting tests results have close contact with a person with symptomatic, laboratory-confirmed COVID-19 or a person under investigation. Close contacts should monitor their health; they should call their provider right away if they develop symptoms suggestive of COVID-19 (e.g., fever, cough, shortness of breath).    Close contacts should also follow these recommendations:  Make sure that you understand  and can help the patient follow their provider's instructions for medication(s) and care. You should help the patient with basic needs in the home and provide support for getting groceries, prescriptions, and other personal needs.  Monitor the patient's symptoms. If the patient is getting sicker, call his or her healthcare provider and tell them that the patient has laboratory-confirmed COVID-19. If the patient has a medical emergency and you need to call 911, notify the dispatch personnel that the patient has, or is being evaluated for COVID-19.  Household members should stay in another room or be  from the patient. Household members should use a separate bedroom and bathroom, if available.  Prohibit visitors.  Household members should care for any pets in the home.  Make sure that shared spaces in the home have good air flow, such as by an air conditioner or an opened window, weather permitting.  Perform hand hygiene frequently. Wash your hands often with soap and water for at least 20 seconds or use an alcohol-based hand  (that contains > 60% alcohol) covering all surfaces of your hands and rubbing them together until they feel dry. Soap and water should be used preferentially.  Avoid touching your eyes, nose, and mouth.  The patient should wear a facemask. If the patient is not able to wear a facemask (for example, because it causes trouble breathing), caregivers should wear a mask when they are in the same room as the patient.  Wear a disposable facemask and gloves when you touch or have contact with the patient's blood, stool, or body fluids, such as saliva, sputum, nasal mucus, vomit, urine.  Throw out disposable facemasks and gloves after using them. Do not reuse.  When removing personal protective equipment, first remove and dispose of gloves. Then, immediately clean your hands with soap and water or alcohol-based hand . Next, remove and dispose of facemask, and immediately clean  your hands again with soap and water or alcohol-based hand .  You should not share dishes, drinking glasses, cups, eating utensils, towels, bedding, or other items with the patient. After the patient uses these items, you should wash them thoroughly (see below Wash laundry thoroughly).  Clean all high-touch surfaces, such as counters, tabletops, doorknobs, bathroom fixtures, toilets, phones, keyboards, tablets, and bedside tables, every day. Also, clean any surfaces that may have blood, stool, or body fluids on them.  Use a household cleaning spray or wipe, according to the label instructions. Labels contain instructions for safe and effective use of the cleaning product including precautions you should take when applying the product, such as wearing gloves and making sure you have good ventilation during use of the product.  Wash laundry thoroughly.  Immediately remove and wash clothes or bedding that have blood, stool, or body fluids on them.  Wear disposable gloves while handling soiled items and keep soiled items away from your body. Clean your hands (with soap and water or an alcohol-based hand ) immediately after removing your gloves.  Read and follow directions on labels of laundry or clothing items and detergent. In general, using a normal laundry detergent according to washing machine instructions and dry thoroughly using the warmest temperatures recommended on the clothing label.  Place all used disposable gloves, facemasks, and other contaminated items in a lined container before disposing of them with other household waste. Clean your hands (with soap and water or an alcohol-based hand ) immediately after handling these items. Soap and water should be used preferentially if hands are visibly dirty.  Discuss any additional questions with your [facility] or local health department or healthcare provider. Check available hours when contacting your local health department.    For  more information see CDC link below.      https://www.cdc.gov/coronavirus/2019-ncov/hcp/guidance-prevent-spread.html#precautions        Sources:  [facility], [place] [facility]          Instructions for Home Care of Patients and Caretakers with Coronavirus Disease 2019  Limit visitors to the home.  Older persons and those that have chronic medical conditions such as diabetes, lung and heart disease are at increased risk for illness.   If possible, patients should use a separate bedroom while recovering. Caregivers and household members should avoid prolonged contact with the patient which means to stay 6 feet away and avoid contact with cough droplets.  When close contact is necessary, wash your hands before and immediately after contact.   Perform hand hygiene frequently. Wash your hands often with soap and water for at least 20 seconds or use an alcohol-based hand , covering all surfaces of your hands and rubbing them together until they feel dry.   Avoid touching your eyes, nose, and mouth with unwashed hands.  Avoid sharing household items with the patient. You should not share dishes, drinking glasses, cups, eating utensils, towels, bedding, or other items. After the patient uses these items, you should wash them thoroughly.  Wash laundry thoroughly.   Immediately remove and wash clothes or bedding that have blood, stool, or body fluids on them.  Clean all high-touch surfaces, such as counters, tabletops, doorknobs, bathroom fixtures, toilets, phones, keyboards, tablets, and bedside tables, every day.   Use a household cleaning spray or wipe, according to the label instructions. Labels contain instructions for safe and effective use of the cleaning product including precautions you should take when applying the product, such as wearing gloves and making sure you have good ventilation during use of the product.    For more information see CDC link below.       https://www.cdc.gov/coronavirus/2019-ncov/hcp/guidance-prevent-spread.html#precautions               If your symptoms worsen or if you have any other concerns, please contact [facility] at [phone].

## 2022-11-14 ENCOUNTER — TELEPHONE (OUTPATIENT)
Dept: URGENT CARE | Facility: CLINIC | Age: 32
End: 2022-11-14
Payer: COMMERCIAL

## 2024-09-12 ENCOUNTER — OFFICE VISIT (OUTPATIENT)
Dept: URGENT CARE | Facility: CLINIC | Age: 34
End: 2024-09-12
Payer: COMMERCIAL

## 2024-09-12 VITALS
RESPIRATION RATE: 18 BRPM | SYSTOLIC BLOOD PRESSURE: 131 MMHG | HEART RATE: 108 BPM | HEIGHT: 65 IN | WEIGHT: 150 LBS | BODY MASS INDEX: 24.99 KG/M2 | TEMPERATURE: 99 F | DIASTOLIC BLOOD PRESSURE: 84 MMHG | OXYGEN SATURATION: 96 %

## 2024-09-12 DIAGNOSIS — R10.9 ABDOMINAL CRAMPING: ICD-10-CM

## 2024-09-12 DIAGNOSIS — R10.84 GENERALIZED ABDOMINAL PAIN: Primary | ICD-10-CM

## 2024-09-12 DIAGNOSIS — R11.2 NAUSEA AND VOMITING, UNSPECIFIED VOMITING TYPE: ICD-10-CM

## 2024-09-12 LAB
B-HCG UR QL: NEGATIVE
CTP QC/QA: YES

## 2024-09-12 PROCEDURE — 81025 URINE PREGNANCY TEST: CPT | Mod: S$GLB,,,

## 2024-09-12 PROCEDURE — 99213 OFFICE O/P EST LOW 20 MIN: CPT | Mod: S$GLB,,,

## 2024-09-12 RX ORDER — DUPILUMAB 300 MG/2ML
INJECTION, SOLUTION SUBCUTANEOUS
COMMUNITY
Start: 2024-09-09

## 2024-09-12 RX ORDER — NORETHINDRONE ACETATE AND ETHINYL ESTRADIOL AND FERROUS FUMARATE 1MG-20(24)
1 KIT ORAL DAILY
COMMUNITY
Start: 2024-07-01

## 2024-09-12 RX ORDER — LOPERAMIDE HYDROCHLORIDE 2 MG/1
2 CAPSULE ORAL DAILY PRN
Qty: 30 CAPSULE | Refills: 0 | Status: SHIPPED | OUTPATIENT
Start: 2024-09-12

## 2024-09-12 RX ORDER — ONDANSETRON 4 MG/1
4 TABLET, ORALLY DISINTEGRATING ORAL EVERY 6 HOURS PRN
Qty: 28 TABLET | Refills: 0 | Status: SHIPPED | OUTPATIENT
Start: 2024-09-12 | End: 2024-09-19

## 2024-09-12 NOTE — PROGRESS NOTES
"Subjective:      Patient ID: Jayleen Gold is a 33 y.o. female.    Vitals:  height is 5' 5" (1.651 m) and weight is 68 kg (150 lb). Her oral temperature is 98.7 °F (37.1 °C). Her blood pressure is 131/84 and her pulse is 108. Her respiration is 18 and oxygen saturation is 96%.     Chief Complaint: Emesis    This is a 33 y.o. female who presents today with a chief complaint of emesis. Symptoms started on Monday. Patient  has had nausea and diarrhea for three weeks.  Patient has not been able to keep food down today.     Provider note starts below:  Patient presents to clinic for evaluation. For the past two weeks, patient has been experiencing intermittent abdominal pain, abdominal bloating, nausea, vomiting, and diarrhea. States that abdominal pain is located in the upper abdomen. Pain is dull and intermittent. States that the pain is worse after eating certain foods, but also comes on when she hasn't eaten for several hours. States she has noticed specifically that caffeine worsens her symptoms. She also reports nausea which has been constant. She has had a decreased appetite due to symptoms. Several days ago, patient started to have vomiting as well. She reports 3 episodes of vomiting today, which is the most since symptoms onset. She also reports intermittent diarrhea for the past 2 weeks. She reports 3 episodes of diarrhea today. She is concerned that symptoms have not improved and seem to be worsening. Her children are in  but not currently sick. She denies any other sick contacts. Patient does report recent change in diet. States she had a death in the family about 3 weeks ago, and friends/family have been bringing meals everyday. States meals are more unhealthy than what her typical diet consists of, for example crawfish enchiladas. She has also been under stress due to current circumstance. Patient did take multiple pregnancy tests at home which were negative. No previous abdominal surgeries. " Currently on dupixent, birth control, Prozac, but denies any new medications.     Emesis   This is a new problem. The current episode started in the past 7 days. The problem occurs 2 to 4 times per day. The emesis has an appearance of stomach contents. There has been no fever. Associated symptoms include abdominal pain and diarrhea. Pertinent negatives include no chest pain, chills, coughing, dizziness, fever, headaches or myalgias. She has tried increased fluids for the symptoms. The treatment provided no relief.       Constitution: Negative for appetite change, chills and fever.   HENT:  Negative for sore throat, trouble swallowing and voice change.    Neck: Negative for neck pain and neck stiffness.   Cardiovascular:  Negative for chest pain and palpitations.   Eyes:  Negative for double vision and blurred vision.   Respiratory:  Negative for cough, shortness of breath and stridor.    Gastrointestinal:  Positive for abdominal pain, abdominal bloating, nausea, vomiting and diarrhea. Negative for constipation and bright red blood in stool.   Genitourinary:  Negative for dysuria, frequency, urgency, flank pain, hematuria, vaginal pain, vaginal discharge and vaginal bleeding.   Musculoskeletal:  Negative for muscle cramps and muscle ache.   Skin:  Negative for pale, rash and erythema.   Neurological:  Negative for dizziness, light-headedness, headaches, disorientation and altered mental status.   Psychiatric/Behavioral:  Negative for altered mental status, disorientation and confusion.       Objective:     Physical Exam   Constitutional: She is oriented to person, place, and time.  Non-toxic appearance. She does not appear ill. No distress.   HENT:   Head: Normocephalic and atraumatic.   Eyes: Conjunctivae are normal. Extraocular movement intact   Neck: Neck supple.   Cardiovascular: Normal rate, regular rhythm, normal heart sounds and normal pulses.   Pulmonary/Chest: Effort normal and breath sounds normal.    Abdominal: Normal appearance. She exhibits no distension. Soft. There is no rebound and no guarding.      Comments: Mild epigastric and RUQ tenderness to palpation. Negative Sterling. Negative McBurney. Negative Rovsing.    Musculoskeletal: Normal range of motion.         General: Normal range of motion.   Neurological: She is alert, oriented to person, place, and time and at baseline.   Skin: Skin is warm, dry, not pale and no rash. No erythema jaundice  Psychiatric: Her behavior is normal. Mood normal.   Nursing note and vitals reviewed.    Assessment:     Results for orders placed or performed in visit on 09/12/24   POCT urine pregnancy   Result Value Ref Range    POC Preg Test, Ur Negative Negative     Acceptable Yes        1. Generalized abdominal pain    2. Nausea and vomiting, unspecified vomiting type    3. Abdominal cramping        Plan:     Generalized abdominal pain  -     Ambulatory referral/consult to Gastroenterology  -     US Abdomen Limited_Gallbladder; Future; Expected date: 09/12/2024    Nausea and vomiting, unspecified vomiting type  -     ondansetron (ZOFRAN-ODT) 4 MG TbDL; Take 1 tablet (4 mg total) by mouth every 6 (six) hours as needed (for nausea/vomiting).  Dispense: 28 tablet; Refill: 0  -     POCT urine pregnancy    Abdominal cramping  -     loperamide (IMODIUM) 2 mg capsule; Take 1 capsule (2 mg total) by mouth daily as needed for Diarrhea (4 mg then 2 mg after each loose stool as needed).  Dispense: 30 capsule; Refill: 0      Medical Decision Making:   Urgent Care Management:  I discussed with patient and/or family/caretaker that evaluation in urgent care does not suggest any emergent or life threatening medical conditions requiring immediate intervention beyond what was provided, and I believe patient is safe to go home.  Regardless, an unremarkable evaluation does not preclude the development or presence of a serious of life threatening condition. As such, patient was  instructed to go to the ED for any worsening or change in current symptoms.         Patient Instructions   Plan of Care  Zofran 4mg every 6 hours as needed for nausea and/or vomiting.  Loperamide (Imodium) 4 mg and then 2 mg after each loose stool as needed for diarrhea. Do not exceed 16 mg daily. Do not use for more than 3 days in a row.   I have ordered an ultrasound of the gallbladder. You should receive a call from [facility] within the next 1-3 days to set up an appointment. If you do not receive a call, you may call our Referral Hotline at [phone] to make an appointment.  A referral has been placed for you to be seen with a GI doctor. You should receive a call from [facility] within the next 1-3 days to set up an appointment. If you do not receive a call, you may call our Referral Hotline at [phone] to make an appointment.    Care at Home  Drink small amounts of fluid every 15 to 30 minutes. Good fluids to drink are water, broth, and oral electrolyte solutions. Sugar-free or very low sugar sports drinks are also OK.   Keep a diary about your pain to help your doctor learn more about the cause. Write down the foods you eat to see if they may be the cause of your pain. Also, write down what you were doing before and during the pain.  Eat small meals more often. Eat more fiber if hard stools are a problem.  When you have pain, you can:  Try to have a bowel movement.  Lie down and rest.  Avoid solid foods for a few hours. If you are hungry, try liquids like broth or water. When you feel better, try mild foods like rice, crackers, bananas, applesauce, or toast.  Foods to eat  Eat whole grain foods and foods high in fiber.  Choose many different fruits and veggies; fresh or frozen is best.  Eat more low-fat or lean protein, like chicken, fish, turkey, or beans. Eat less red meat.  Choose low-fat or fat-free dairy products.  Foods to avoid  Cut back on solid fats, like butter and margarine. Eat less fatty or  processed foods.  Whole and 2% milk, whole milk yogurt and ice cream, cream, half-and-half, full-fat cheese, or full-fat sour cream  Cheese, butter, or cream sauces  Doughnuts, croissants, pastries, cookies, biscuits, chips, or buttered popcorn  High-fat meats like ribs, T-Bone, high-fat ground beef, gee, sausage, salami, bologna, corned beef, hot dogs, organ meats, poultry with the skin, fried meats/fish/poultry    When do I need to call the doctor?   You have sudden severe belly pain, or the pain is constant.  You have trouble breathing or chest pain along with your belly pain.  You start throwing up blood or pass a lot of blood in your stool.  Your belly becomes very hard or swollen.  You have signs of severe fluid loss, such as:  No urine for more than 8 hours.  You feel very light-headed or like you are going to pass out.  You feel weak, like you are going to fall.  Your pain gets worse, comes more often, or moves to one area of the belly.  You have an upset stomach or throwing up that isnt getting better and are having trouble keeping down food and drink.  Your stools are black or tar-colored.  If the pain is not gone or not getting better in 1 to 2 days.  You have a fever of 100.4°F (38°C) or higher, chills.  You develop early signs of fluid loss, such as:  Your urine is very dark-colored.  Your mouth is dry.  You have muscle cramps.  You have a lack of energy.  You feel light-headed when you get up.  You have pain with passing urine or have blood in your urine.  Your stools have a small amount (less than 1 teaspoon or 5 mL) of blood in them.  You feel that something is not right in your belly.    Should you develop any worsening or new symptoms after leaving urgent care, it is recommended that you go to the ER for further/repeat evaluation.      Follow up with your PCP in 3-5 days after your urgent care visit.     Please remember that you have received care at an urgent care today. Urgent cares are not  emergency rooms and are not equipped to handle life threatening emergencies and cannot rule in or out certain medical conditions and you may be released before all of your medical problems are known or treated, please schedule all follow up appointments as discussed and if you have worsening symptoms please go to the ER to rule out potential life threatening problems, as discussed.

## 2024-09-12 NOTE — PATIENT INSTRUCTIONS
Plan of Care  Zofran 4mg every 6 hours as needed for nausea and/or vomiting.  Loperamide (Imodium) 4 mg and then 2 mg after each loose stool as needed for diarrhea. Do not exceed 16 mg daily. Do not use for more than 3 days in a row.   I have ordered an ultrasound of the gallbladder. You should receive a call from [facility] within the next 1-3 days to set up an appointment. If you do not receive a call, you may call our Referral Hotline at [phone] to make an appointment.  A referral has been placed for you to be seen with a GI doctor. You should receive a call from [facility] within the next 1-3 days to set up an appointment. If you do not receive a call, you may call our Referral Hotline at [phone] to make an appointment.    Care at Home  Drink small amounts of fluid every 15 to 30 minutes. Good fluids to drink are water, broth, and oral electrolyte solutions. Sugar-free or very low sugar sports drinks are also OK.   Keep a diary about your pain to help your doctor learn more about the cause. Write down the foods you eat to see if they may be the cause of your pain. Also, write down what you were doing before and during the pain.  Eat small meals more often. Eat more fiber if hard stools are a problem.  When you have pain, you can:  Try to have a bowel movement.  Lie down and rest.  Avoid solid foods for a few hours. If you are hungry, try liquids like broth or water. When you feel better, try mild foods like rice, crackers, bananas, applesauce, or toast.  Foods to eat  Eat whole grain foods and foods high in fiber.  Choose many different fruits and veggies; fresh or frozen is best.  Eat more low-fat or lean protein, like chicken, fish, turkey, or beans. Eat less red meat.  Choose low-fat or fat-free dairy products.  Foods to avoid  Cut back on solid fats, like butter and margarine. Eat less fatty or processed foods.  Whole and 2% milk, whole milk yogurt and ice cream, cream, half-and-half, full-fat  cheese, or full-fat sour cream  Cheese, butter, or cream sauces  Doughnuts, croissants, pastries, cookies, biscuits, chips, or buttered popcorn  High-fat meats like ribs, T-Bone, high-fat ground beef, gee, sausage, salami, bologna, corned beef, hot dogs, organ meats, poultry with the skin, fried meats/fish/poultry    When do I need to call the doctor?   You have sudden severe belly pain, or the pain is constant.  You have trouble breathing or chest pain along with your belly pain.  You start throwing up blood or pass a lot of blood in your stool.  Your belly becomes very hard or swollen.  You have signs of severe fluid loss, such as:  No urine for more than 8 hours.  You feel very light-headed or like you are going to pass out.  You feel weak, like you are going to fall.  Your pain gets worse, comes more often, or moves to one area of the belly.  You have an upset stomach or throwing up that isnt getting better and are having trouble keeping down food and drink.  Your stools are black or tar-colored.  If the pain is not gone or not getting better in 1 to 2 days.  You have a fever of 100.4°F (38°C) or higher, chills.  You develop early signs of fluid loss, such as:  Your urine is very dark-colored.  Your mouth is dry.  You have muscle cramps.  You have a lack of energy.  You feel light-headed when you get up.  You have pain with passing urine or have blood in your urine.  Your stools have a small amount (less than 1 teaspoon or 5 mL) of blood in them.  You feel that something is not right in your belly.    Should you develop any worsening or new symptoms after leaving urgent care, it is recommended that you go to the ER for further/repeat evaluation.      Follow up with your PCP in 3-5 days after your urgent care visit.     Please remember that you have received care at an urgent care today. Urgent cares are not emergency rooms and are not equipped to handle life threatening emergencies and cannot rule in or out  certain medical conditions and you may be released before all of your medical problems are known or treated, please schedule all follow up appointments as discussed and if you have worsening symptoms please go to the ER to rule out potential life threatening problems, as discussed.

## 2024-09-19 ENCOUNTER — OFFICE VISIT (OUTPATIENT)
Dept: GASTROENTEROLOGY | Facility: CLINIC | Age: 34
End: 2024-09-19
Payer: COMMERCIAL

## 2024-09-19 VITALS
WEIGHT: 152.56 LBS | BODY MASS INDEX: 24.52 KG/M2 | HEART RATE: 85 BPM | SYSTOLIC BLOOD PRESSURE: 131 MMHG | DIASTOLIC BLOOD PRESSURE: 85 MMHG | HEIGHT: 66 IN

## 2024-09-19 DIAGNOSIS — R11.2 NAUSEA AND VOMITING, UNSPECIFIED VOMITING TYPE: ICD-10-CM

## 2024-09-19 DIAGNOSIS — R10.84 GENERALIZED ABDOMINAL PAIN: Primary | ICD-10-CM

## 2024-09-19 DIAGNOSIS — K21.9 GASTROESOPHAGEAL REFLUX DISEASE, UNSPECIFIED WHETHER ESOPHAGITIS PRESENT: ICD-10-CM

## 2024-09-19 PROCEDURE — 1159F MED LIST DOCD IN RCRD: CPT | Mod: CPTII,S$GLB,,

## 2024-09-19 PROCEDURE — 99999 PR PBB SHADOW E&M-EST. PATIENT-LVL IV: CPT | Mod: PBBFAC,,,

## 2024-09-19 PROCEDURE — 3075F SYST BP GE 130 - 139MM HG: CPT | Mod: CPTII,S$GLB,,

## 2024-09-19 PROCEDURE — 99204 OFFICE O/P NEW MOD 45 MIN: CPT | Mod: S$GLB,,,

## 2024-09-19 PROCEDURE — 1160F RVW MEDS BY RX/DR IN RCRD: CPT | Mod: CPTII,S$GLB,,

## 2024-09-19 PROCEDURE — 3079F DIAST BP 80-89 MM HG: CPT | Mod: CPTII,S$GLB,,

## 2024-09-19 PROCEDURE — 3008F BODY MASS INDEX DOCD: CPT | Mod: CPTII,S$GLB,,

## 2024-09-19 RX ORDER — SUCRALFATE 1 G/1
1 TABLET ORAL 4 TIMES DAILY PRN
Qty: 30 TABLET | Refills: 0 | Status: SHIPPED | OUTPATIENT
Start: 2024-09-19

## 2024-09-19 RX ORDER — PANTOPRAZOLE SODIUM 40 MG/1
40 TABLET, DELAYED RELEASE ORAL DAILY
Qty: 30 TABLET | Refills: 11 | Status: SHIPPED | OUTPATIENT
Start: 2024-09-19 | End: 2025-09-19

## 2024-09-19 NOTE — PROGRESS NOTES
Gastroenterology Clinic Consultation Note    Reason for Visit:  The primary encounter diagnosis was Generalized abdominal pain. Diagnoses of Gastroesophageal reflux disease, unspecified whether esophagitis present and Nausea and vomiting, unspecified vomiting type were also pertinent to this visit.    PCP:   Risa, Primary Doctor   [address] [facility] / [place] [id number]      Initial HPI   This is a 33 y.o. female presenting for nausea/vomiting, GERD, generalized abdominal pain   Patient in clinic with complaints of new onset nausea, vomiting and abdominal pain. For the past three weeks, patient has been experiencing intermittent abdominal pain,  nausea, vomiting, and diarrhea. States that abdominal pain is located in the upper abdomen. Pain is dull and intermittent. States that the pain is worse after eating certain foods, but also comes on when she hasn't eaten for several hours. States she has noticed specifically that caffeine worsens her symptoms. She also reports nausea which has been constant. She has had a decreased appetite due to symptoms. She also reports intermittent diarrhea for the past 3 weeks which has been better for the past 3 days. Her children are in  but not currently sick. She denies any other sick contacts. Patient does report recent change in diet. States she had a death in the family about 4-5 weeks ago, and friends/family have been bringing meals everyday. States meals are more unhealthy than what her typical diet consists of. She has also been under stress due to current circumstance. Patient did take multiple pregnancy tests at home which were negative. No previous abdominal surgeries. Currently on dupixent, birth control, Prozac, but denies any new medications. She was seen in the ED 9/14 with similiar complaints of epigastric abdominal pain. Notes pain worsens after eating. Patient reports associated nausea, vomiting and diarrhea. She was taking Zofran and Imodium with some relief  of symptoms. Work up included an US which was unremarkable, blood work which was unremarkable and urinalysis which was unremarkable. She reports reflux symptoms which have been ongoing. She will occasionally take pepcid with relief. Her s/s get worse at night. Her triggers are acidic foods and drinks. She drinks alcohol on weekends, denies smoking cigarettes, drinks coffee daily, drink carbonated beverages rarely. Maternal grandpa had CRC in his 70s. Denies previous colonoscopy or EGD.     ROS:  Review of Systems   Constitutional:  Negative for chills, fever, malaise/fatigue and weight loss.   Respiratory:  Negative for shortness of breath.    Cardiovascular:  Negative for chest pain.   Gastrointestinal:  Positive for abdominal pain, heartburn, nausea and vomiting. Negative for blood in stool, constipation, diarrhea and melena.   Neurological:  Negative for dizziness and weakness.        Medical History:  has a past medical history of Allergy and Depression.    Surgical History:  has a past surgical history that includes [place] tooth extraction and Sinus surgery.    Family History: Family history is unknown by patient..       Review of patient's allergies indicates:  No Known Allergies    Current Outpatient Medications on File Prior to Visit   Medication Sig Dispense Refill    AUROVELA 24 FE 1 mg-20 mcg (24)/75 mg (4) per tablet Take 1 tablet by mouth once daily.      DUPIXENT  mg/2 mL PnIj Inject into the skin.      FLUoxetine 20 MG capsule Take 20 mg by mouth once daily.      ondansetron (ZOFRAN-ODT) 4 MG TbDL Take 1 tablet (4 mg total) by mouth every 6 (six) hours as needed (for nausea/vomiting). 28 tablet 0    [DISCONTINUED] diphenhydrAMINE-aluminum-magnesium hydroxide-simethicone-LIDOcaine HCl 2% Swish and spit 15 mLs every 6 (six) hours as needed (sore throat). (Patient not taking: Reported on 9/12/2024) 220 each 0    [DISCONTINUED] loperamide (IMODIUM) 2 mg capsule Take 1 capsule (2 mg total) by mouth  "daily as needed for Diarrhea (4 mg then 2 mg after each loose stool as needed). 30 capsule 0     No current facility-administered medications on file prior to visit.         Objective Findings:    Vital Signs:  /85   Pulse 85   Ht 5' 6" (1.676 m)   Wt 69.2 kg (152 lb 8.9 oz)   LMP 08/12/2024   BMI 24.62 kg/m²   Body mass index is 24.62 kg/m².    Physical Exam:  Physical Exam  Vitals and nursing note reviewed.   Constitutional:       General: She is not in acute distress.     Appearance: Normal appearance. She is not ill-appearing.   HENT:      Head: Normocephalic and atraumatic.      Right Ear: External ear normal.      Left Ear: External ear normal.      Nose: Nose normal.   Eyes:      General: No scleral icterus.     Extraocular Movements: Extraocular movements intact.   Cardiovascular:      Rate and Rhythm: Normal rate.   Pulmonary:      Effort: Pulmonary effort is normal. No respiratory distress.   Abdominal:      General: Bowel sounds are normal. There is no distension.      Palpations: Abdomen is soft.      Tenderness: There is no guarding.   Musculoskeletal:         General: Normal range of motion.      Cervical back: Normal range of motion.   Skin:     General: Skin is warm.   Neurological:      Mental Status: She is alert and oriented to person, place, and time.   Psychiatric:         Mood and Affect: Mood normal.         Behavior: Behavior is cooperative.         Thought Content: Thought content normal.           Labs:  Lab Results   Component Value Date    ALKPHOS 54 08/31/2022    LIPASE 16 09/14/2024    ALT 24 08/31/2022    AST 22 08/31/2022     08/31/2022    K 4.6 08/31/2022    CREATININE 0.67 08/31/2022    BUN 13 08/31/2022    CO2 29 08/31/2022       Imaging reviewed:   ULTRASOUND OF THE ABDOMEN 9/14/2024    Findings:  Sonographic images of the abdomen demonstrates normal appearance of the pancreas.  There is no pancreatic ductal dilatation.  The liver appears homogeneous in " echotexture and measures 15.3 cm.  There is no intrahepatic biliary ductal dilatation.  Common bile duct is normal and measures 2.8 mm.  The gallbladder appears normal, without gallstones, wall thickening or pericholecystic fluid.  The spleen appears homogeneous in echotexture and measures 8.9 cm.   Images of the kidneys demonstrate no hydronephrosis or nephrolithiasis.  The right kidney measures 10.6 cm.  The left kidney measures 11.4 cm.  The inferior vena cava and the abdominal aorta appears within normal limits.  There is no free fluid in the abdomen. Color Doppler and duplex of the portal vein shows hepatopetal flow. 47 cm/sec.       Endoscopy reviewed: NA    Assessment:  1. Generalized abdominal pain    2. Gastroesophageal reflux disease, unspecified whether esophagitis present    3. Nausea and vomiting, unspecified vomiting type      Orders Placed This Encounter    pantoprazole (PROTONIX) 40 MG tablet    sucralfate (CARAFATE) 1 gram tablet           For GERD/Reflux:  Take your PPI 30-45 minutes before your first protein containing meal (breakfast) every day. Take daily for 8 weeks. If symptoms improve ok discontinue an use PPI as needed for symptoms.  Carafate before every meal. Can you use up to 4 times per day.    Take Pepcid 20 mg every evening before bedtime to help with nocturnal symptoms, as needed.  Remain upright for at least 3 hours after eating.   Elevate the head of the bed for nighttime.   Avoid foods that you have noticed make your symptoms worse (possible triggers include: peppermint, alcohol, chocolate, caffeine, spicy foods, greasy/fried foods, acidic foods-citrus).   Lose weight if you are overweight -- of all of the lifestyle changes you can make, this one is the most effective.  Follow up in 8 weeks with provider to assess symptoms and ability to taper off PPI (can be a virtual visit).        Thank you for allowing me to participate in this patient's care.    Sincerely,     MONICA BAKER,  FNP-C  Gastroenterology Department  [facility] [phone]

## 2024-09-19 NOTE — PATIENT INSTRUCTIONS
GERD Treatment: Lifestyle and Dietary Changes    Dietary and lifestyle changes are the first step in treating GERD. Certain foods make the reflux worse. Suggestions to help alleviate symptoms include:  Lose weight if you are overweight -- of all of the lifestyle changes you can make, this one is the most effective.  Avoid foods that increase the level of acid in your stomach, including caffeinated beverages.  Avoid foods that decrease the pressure in the lower esophagus, such as fatty foods, alcohol and peppermint.  Avoid foods that affect peristalsis (the muscle movements in your digestive tract), such as coffee, alcohol and acidic liquids.  Avoid foods that slow gastric emptying, including fatty foods.  Avoid large meals.  Quit smoking.  Do not lie down immediately after a meal.  Elevate the level of your head when you lie down.    Foods That May Cause Heartburn  Foods commonly known to be heartburn triggers cause the esophageal sphincter to relax and delay the digestive process, letting food sit in the stomach longer, says Jose Raul. The worst culprits? Foods that are high in fat, salt or spice such as:  Fried food  Fast food  Pizza  Potato chips and other processed snacks  Chili powder and pepper (white, black, cayenne)  Fatty meats such as gee and sausage  Cheese  Other foods that can cause the same problem include:  Tomato-based sauces  Citrus fruits  Chocolate  Peppermint  Carbonated beverages    Moderation is key since many people may not be able to or want to completely eliminate these foods. But try to avoid eating problem foods late in the evening closer to bedtime, so they're not sitting in your stomach and then coming up your esophagus when you lay down at night. It's also a good idea to eat small frequent meals instead of bigger, heavier meals and avoid late-night dinners and bedtime snacks.    Foods That Help Prevent Acid Reflux  Good news: There are plenty of things you can eat to help prevent acid  reflux. Stock your kitchen with foods from these three categories:    High-fiber foods  Fibrous foods make you feel full so you're less likely to overeat, which may contribute to heartburn. So, load up on healthy fiber from these foods:  Whole grains such as oatmeal, couscous and brown rice.  Root vegetables such as sweet potatoes, carrots and beets.  Green vegetables such as asparagus, broccoli and green beans.    Alkaline foods  Foods fall somewhere along the pH scale (an indicator of acid levels). Those that have a low pH are acidic and more likely to cause reflux. Those with higher pH are alkaline and can help offset strong stomach acid. Alkaline foods include:  Bananas  Melons  Cauliflower  Fennel  Nuts    Watery foods  Eating foods that contain a lot of water can dilute and weaken stomach acid. Choose foods such as:  Celery  Cucumber  Lettuce  Watermelon  Broth-based soups  Herbal tea           Abruptly stopping your PPI is not recommended. A tapering method, with a H2 blocker such as Pepcid 10 mg BID or Zantac 150 mg twice daily, with your current PPI is an appropriate method to weaning off the PPI.    To begin tapering, alternate either Pepcid 10mg 2x/day or Zantac 150 mg 2x/day, with your current PPI every other day for 2 weeks.  Then alternate every 3rd day for 2 weeks.  Then alternate every 4th day for 2 weeks.  Then alternate every 5th day for 2 weeks.  Then alternate every 6th day for 2 weeks.  After those two weeks, you will be on an everyday regimen of the H2 blocker BID until instructed to stop taking the medication.

## 2024-11-14 ENCOUNTER — OFFICE VISIT (OUTPATIENT)
Dept: GASTROENTEROLOGY | Facility: CLINIC | Age: 34
End: 2024-11-14
Payer: COMMERCIAL

## 2024-11-14 DIAGNOSIS — K21.9 GASTROESOPHAGEAL REFLUX DISEASE, UNSPECIFIED WHETHER ESOPHAGITIS PRESENT: ICD-10-CM

## 2024-11-14 DIAGNOSIS — R53.83 FATIGUE, UNSPECIFIED TYPE: ICD-10-CM

## 2024-11-14 DIAGNOSIS — R11.2 NAUSEA AND VOMITING, UNSPECIFIED VOMITING TYPE: ICD-10-CM

## 2024-11-14 DIAGNOSIS — R19.7 DIARRHEA, UNSPECIFIED TYPE: ICD-10-CM

## 2024-11-14 DIAGNOSIS — M25.50 ARTHRALGIA, UNSPECIFIED JOINT: ICD-10-CM

## 2024-11-14 DIAGNOSIS — R10.13 EPIGASTRIC PAIN: Primary | ICD-10-CM

## 2024-11-14 PROCEDURE — 99214 OFFICE O/P EST MOD 30 MIN: CPT | Mod: 95,,,

## 2024-11-14 PROCEDURE — 1160F RVW MEDS BY RX/DR IN RCRD: CPT | Mod: CPTII,95,,

## 2024-11-14 PROCEDURE — 1159F MED LIST DOCD IN RCRD: CPT | Mod: CPTII,95,,

## 2024-11-14 RX ORDER — ONDANSETRON 4 MG/1
4 TABLET, ORALLY DISINTEGRATING ORAL EVERY 8 HOURS PRN
Qty: 30 TABLET | Refills: 0 | Status: SHIPPED | OUTPATIENT
Start: 2024-11-14

## 2024-11-14 NOTE — PROGRESS NOTES
The patient location is: home   The chief complaint leading to consultation is: f/u for nausea/vomiting, diarrhea    Visit type: audiovisual    Face to Face time with patient: 15  45 minutes of total time spent on the encounter, which includes face to face time and non-face to face time preparing to see the patient (eg, review of tests), Obtaining and/or reviewing separately obtained history, Documenting clinical information in the electronic or other health record, Independently interpreting results (not separately reported) and communicating results to the patient/family/caregiver, or Care coordination (not separately reported).         Each patient to whom he or she provides medical services by telemedicine is:  (1) informed of the relationship between the physician and patient and the respective role of any other health care provider with respect to management of the patient; and (2) notified that he or she may decline to receive medical services by telemedicine and may withdraw from such care at any time.    Notes:     Gastroenterology Clinic Consultation Note    Reason for Visit:  The primary encounter diagnosis was Epigastric pain. Diagnoses of Gastroesophageal reflux disease, unspecified whether esophagitis present, Nausea and vomiting, unspecified vomiting type, Diarrhea, unspecified type, Fatigue, unspecified type, and Arthralgia, unspecified joint were also pertinent to this visit.    PCP:   No, Primary Doctor         Initial HPI   This is a 33 y.o. female presenting for GERD   Patient on virtual visit for 8 week symptom check on PPI. She reports she is continuing to have occasional nausea and vomiting and intermittent epigastric pain. She reports the symptoms have become less frequent and intense but are still present. She is also continuing to have diarrhea since August, stool studies have not been done. She is more recently concerned with worsening fatigue and joint pain. She has not had an INDU panel  done. She has been taking her PPI every AM and occasionally taking Pepcid at night. Denies unintentional weight loss, fever, chills, constipation, regurgitation, hematemesis, difficulties swallowing, blood in stool, and melena. She has not had a colonoscopy or EGD in the past.       9/19 me - nausea/vomiting, GERD, generalized abdominal pain   Patient in clinic with complaints of new onset nausea, vomiting and abdominal pain. For the past three weeks, patient has been experiencing intermittent abdominal pain,  nausea, vomiting, and diarrhea. States that abdominal pain is located in the upper abdomen. Pain is dull and intermittent. States that the pain is worse after eating certain foods, but also comes on when she hasn't eaten for several hours. States she has noticed specifically that caffeine worsens her symptoms. She also reports nausea which has been constant. She has had a decreased appetite due to symptoms. She also reports intermittent diarrhea for the past 3 weeks which has been better for the past 3 days. Her children are in  but not currently sick. She denies any other sick contacts. Patient does report recent change in diet. States she had a death in the family about 4-5 weeks ago, and friends/family have been bringing meals everyday. States meals are more unhealthy than what her typical diet consists of. She has also been under stress due to current circumstance. Patient did take multiple pregnancy tests at home which were negative. No previous abdominal surgeries. Currently on dupixent, birth control, Prozac, but denies any new medications. She was seen in the ED 9/14 with similiar complaints of epigastric abdominal pain. Notes pain worsens after eating. Patient reports associated nausea, vomiting and diarrhea. She was taking Zofran and Imodium with some relief of symptoms. Work up included an US which was unremarkable, blood work which was unremarkable and urinalysis which was unremarkable. She  reports reflux symptoms which have been ongoing. She will occasionally take pepcid with relief. Her s/s get worse at night. Her triggers are acidic foods and drinks. She drinks alcohol on weekends, denies smoking cigarettes, drinks coffee daily, drink carbonated beverages rarely. Maternal grandpa had CRC in his 70s. Denies previous colonoscopy or EGD.       ROS:  Review of Systems   Constitutional:  Positive for malaise/fatigue. Negative for chills, fever and weight loss.   Respiratory:  Negative for shortness of breath.    Cardiovascular:  Negative for chest pain.   Gastrointestinal:  Positive for abdominal pain, diarrhea, heartburn, nausea and vomiting. Negative for blood in stool, constipation and melena.   Musculoskeletal:  Positive for joint pain.   Neurological:  Negative for weakness.        Medical History:  has a past medical history of Allergy and Depression.    Surgical History:  has a past surgical history that includes [place] tooth extraction and Sinus surgery.    Family History: Family history is unknown by patient..       Review of patient's allergies indicates:  No Known Allergies    Current Outpatient Medications on File Prior to Visit   Medication Sig Dispense Refill    AUROVELA 24 FE 1 mg-20 mcg (24)/75 mg (4) per tablet Take 1 tablet by mouth once daily.      DUPIXENT  mg/2 mL PnIj Inject into the skin.      FLUoxetine 20 MG capsule Take 20 mg by mouth once daily.      pantoprazole (PROTONIX) 40 MG tablet Take 1 tablet (40 mg total) by mouth once daily. 30 tablet 11    sucralfate (CARAFATE) 1 gram tablet Take 1 tablet (1 g total) by mouth 4 (four) times daily as needed (dyspepsia). 30 tablet 0     No current facility-administered medications on file prior to visit.         Objective Findings:    Vital Signs:  There were no vitals taken for this visit.  There is no height or weight on file to calculate BMI.    Physical Exam:  Physical Exam  Constitutional:       General: She is not in acute  distress.     Appearance: Normal appearance. She is not ill-appearing.   HENT:      Head: Normocephalic and atraumatic.   Eyes:      Extraocular Movements: Extraocular movements intact.   Pulmonary:      Effort: Pulmonary effort is normal. No respiratory distress.   Musculoskeletal:      Cervical back: Normal range of motion.   Neurological:      Mental Status: She is alert and oriented to person, place, and time.             Labs:  Lab Results   Component Value Date    ALKPHOS 54 08/31/2022    LIPASE 16 09/14/2024    ALT 24 08/31/2022    AST 22 08/31/2022     08/31/2022    K 4.6 08/31/2022    CREATININE 0.67 08/31/2022    BUN 13 08/31/2022    CO2 29 08/31/2022       Imaging reviewed:   ULTRASOUND OF THE ABDOMEN    Clinical history:  ABDOMINAL PAIN.    Technique:  Transabdominal grayscale, color Doppler and duplex sonographic images of the abdomen were obtained.    Findings:  Sonographic images of the abdomen demonstrates normal appearance of the pancreas.  There is no pancreatic ductal dilatation.  The liver appears homogeneous in echotexture and measures 15.3 cm.  There is no intrahepatic biliary ductal dilatation.  Common bile duct is normal and measures 2.8 mm.  The gallbladder appears normal, without gallstones, wall thickening or pericholecystic fluid.  The spleen appears homogeneous in echotexture and measures 8.9 cm.  Images of the kidneys demonstrate no hydronephrosis or nephrolithiasis.  The right kidney measures 10.6 cm.  The left kidney measures 11.4 cm.  The inferior vena cava and the abdominal aorta appears within normal limits.  There is no free fluid in the abdomen.    Color Doppler and duplex of the portal vein shows hepatopetal flow. 47 cm/sec.  Exam End: 09/14/24 15:00         Endoscopy reviewed: NA       Assessment:  1. Epigastric pain    2. Gastroesophageal reflux disease, unspecified whether esophagitis present    3. Nausea and vomiting, unspecified vomiting type    4. Diarrhea,  unspecified type    5. Fatigue, unspecified type    6. Arthralgia, unspecified joint      Orders Placed This Encounter    CBC W/ AUTO DIFFERENTIAL    COMPREHENSIVE METABOLIC PANEL    INDU Screen w/Reflex    Celiac Disease Panel    TSH    Lipase    Pancreatic elastase, fecal    Fecal fat, qualitative    Calprotectin, Stool    Giardia / Cryptosporidum, EIA    Stool Exam-Ova,Cysts,Parasites    IRON AND TIBC    Ferritin    ondansetron (ZOFRAN-ODT) 4 MG TbDL       Plan:  EGD for continued N&V, epigastric pain  2.   Stool studies to assess for possible acute/infectious/inflammatory processes for diarrhea   3.   Consider colonoscopy based on stool studies   4.   Consider GES based on scope  5.   Blood work as above for ongoing and new onset complaints listed   6.   Take zofran as needed for nausea     Thank you for allowing me to participate in this patient's care.    Sincerely,     MONICA BAKER, DARELLP-C  Gastroenterology Department  [facility] Office [phone]

## 2024-11-18 ENCOUNTER — TELEPHONE (OUTPATIENT)
Dept: ENDOSCOPY | Facility: HOSPITAL | Age: 34
End: 2024-11-18
Payer: COMMERCIAL

## 2024-11-18 NOTE — TELEPHONE ENCOUNTER
"Contacted patient to schedule an endoscopy procedure(s): EGD. The patient did not answer the call. Voice message left requesting a call back and portal message sent.                        ----- Message -----  From: Lila Braga FNP-C  Sent: 2024  11:31 AM CST  To: [facility] Endosc[facility] Patients  Subject: EGD                                              Procedure: EGD    Diagnosis: Abdominal pain, GERD, and Nausea/Vomiting    Procedure Timin-12 weeks    *If within 4 weeks selected, please david as high priority*    *If greater than 12 weeks, please select "5-12 weeks" and delay sending until 3 months prior to requested date*    Location: Any Site    Additional Scheduling Information: No scheduling concerns    Prep Specifications:N/A    Is the patient taking a GLP-1 Agonist:no    Have you attached a patient to this message: yes  "

## 2024-11-26 ENCOUNTER — TELEPHONE (OUTPATIENT)
Dept: ENDOSCOPY | Facility: HOSPITAL | Age: 34
End: 2024-11-26
Payer: COMMERCIAL

## 2024-11-26 DIAGNOSIS — R11.2 NAUSEA AND VOMITING, UNSPECIFIED VOMITING TYPE: ICD-10-CM

## 2024-11-26 DIAGNOSIS — K21.9 GASTROESOPHAGEAL REFLUX DISEASE, UNSPECIFIED WHETHER ESOPHAGITIS PRESENT: ICD-10-CM

## 2024-11-26 DIAGNOSIS — R10.9 ABDOMINAL PAIN, UNSPECIFIED ABDOMINAL LOCATION: Primary | ICD-10-CM

## 2024-11-26 NOTE — TELEPHONE ENCOUNTER
Referral for procedure from [facility]      Spoke to pt to schedule procedure(s) Upper Endoscopy (EGD)       Physician to perform procedure(s) Dr. JOSELUIS Mathis  Date of Procedure (s) 12/13/2024  Arrival Time 12:00 PM  Time of Procedure(s) 1:00 PM   Location of Procedure(s) [facility] 2nd Floor  Type of Rx Prep sent to patient: N/A  Instructions provided to patient via Email    Patient was informed on the following information and verbalized understanding. Screening questionnaire reviewed with patient and complete. If procedure requires anesthesia, a responsible adult needs to be present to accompany the patient home, patient cannot drive after receiving anesthesia. Appointment details are tentative, especially check-in time. Patient will receive a prep-op call 7 days prior to confirm check-in time for procedure. If applicable the patient should contact their pharmacy to verify Rx for procedure prep is ready for pick-up. Patient was advised to call the scheduling department at [phone] if pharmacy states no Rx is available. Patient was advised to call the endoscopy scheduling department if any questions or concerns arise.       Endoscopy Scheduling Department             EGD Procedure Prep Instructions      Date of procedure: 12/13/2024 Arrive at: 12:00PM      Location of Department: [address] [place] [address]  Take the Elevators to 2nd Floor Endoscopy Procedural Area    How to prep:    Day Before Procedure: 12/12/2024     You may have a light evening meal.   No solid food after 7:00 pm.   Continue drinking clear liquids.       Day of the Procedure:  12/13/2024     You may have water/clear liquids until 2 hours before your procedure or as directed by the scheduling nurse  11:00AM. See below for list.    What You CANNOT do:   Do not drink milk or anything colored red.  Do not drink alcohol.  No gum chewing or candy morning of procedure.    Liquids That Are OK to Drink:   Water  Sports drinks  (Gatorade, Power-Aid)  Coffee or tea (no cream or nondairy creamer)  Clear juices without pulp (apple, white grape)  Gelatin desserts (no fruit or toppings)  Clear soda (sprite, coke, ginger ale)  Chicken broth (until 12 midnight the night before procedure)      Comments:             IMPORTANT INFORMATION TO KNOW BEFORE YOUR PROCEDURE    [facility] 2nd Floor         If your procedure requires the administration of anesthesia, it is necessary for a responsible adult to drive you home. (Medical Transportation, Uber, Lyft, Taxi, etc. may ONLY be used if a responsible adult is present to accompany you home.  The responsible adult CAN'T be the  of the service).      person must be available to return to pick you up within 15 minutes of being notified of discharge.       Please bring a picture ID, insurance card, & copayment      Take Medications as directed below:      If you begin taking any blood thinning medications, injectable weight loss/diabetes medications (other than insulin) , or Adipex (Phentermine) please contact the endoscopy scheduling department listed below as soon as possible.    If you are diabetic see the attached instruction sheet regarding your medication.     If you take HEART, BLOOD PRESSURE, SEIZURE, PAIN, LUNG (including inhalers/nebulizers), ANTI-REJECTION (transplant patients), or PSYCHIATRIC medications, please take at your regular times with a sip of water or as directed by the scheduling nurse.     Important contact information:    Endoscopy [phone] Hours of operation Monday-Friday 8:00-4:30pm.    Questions about insurance or financial obligations call [phone] or [phone].    If you have questions regarding the prep or need to reschedule, please call [phone]. After hours questions requiring immediate assistance, contact [facility] nurse line at [phone] or [phone].   NOTE:     On occasion, unforeseen  "circumstances may cause a delay in your procedure start time. We respect your time and appreciate your patience during these circumstances.      Comments:                  ----- Message -----   From: Lila Braga FNP-C   Sent: 2024  11:31 AM CST   To: [facility] Endoscopist [facility] Patients   Subject: EGD                                              Procedure: EGD     Diagnosis: Abdominal pain, GERD, and Nausea/Vomiting     Procedure Timin-12 weeks     *If within 4 weeks selected, please david as high priority*     *If greater than 12 weeks, please select "5-12 weeks" and delay sending until 3 months prior to requested date*     Location: Any Site     Additional Scheduling Information: No scheduling concerns     Prep Specifications:N/A     Is the patient taking a GLP-1 Agonist:no     Have you attached a patient to this message: yes   "

## 2024-12-10 ENCOUNTER — ANESTHESIA EVENT (OUTPATIENT)
Dept: ENDOSCOPY | Facility: HOSPITAL | Age: 34
End: 2024-12-10
Payer: COMMERCIAL

## 2024-12-10 NOTE — ANESTHESIA PREPROCEDURE EVALUATION
12/10/2024  Jayleen Gold is a 33 y.o., female.    Procedure: EGD (ESOPHAGOGASTRODUODENOSCOPY) (N/A)       There is no problem list on file for this patient.      Past Medical History:   Diagnosis Date    Allergy     Depression        ECHO: No results found for this or any previous visit.      There is no height or weight on file to calculate BMI.    Tobacco Use: Low Risk  (11/14/2024)    Patient History     Smoking Tobacco Use: Never     Smokeless Tobacco Use: Never     Passive Exposure: Not on file       Social History     Substance and Sexual Activity   Drug Use Not on file        Alcohol Use: Alcohol Misuse (9/19/2024)    AUDIT-C     Frequency of Alcohol Consumption: 2-4 times a month     Average Number of Drinks: 3 or 4     Frequency of Binge Drinking: Less than monthly       Review of patient's allergies indicates:  No Known Allergies      Airway:  No value filed.    Pre-op Assessment    I have reviewed the Patient Summary Reports.    I have reviewed the NPO Status.   I have reviewed the Medications.     Review of Systems  Anesthesia Hx:             Denies Family Hx of Anesthesia complications.    Denies Personal Hx of Anesthesia complications.                    Hematology/Oncology:  Hematology Normal   Oncology Normal                                   EENT/Dental:  EENT/Dental Normal           Cardiovascular:  Cardiovascular Normal                                              Pulmonary:  Pulmonary Normal                       Renal/:  Renal/ Normal                 Hepatic/GI:  Hepatic/GI Normal                    Musculoskeletal:  Musculoskeletal Normal                Neurological:  Neurology Normal                                      Endocrine:  Endocrine Normal            Dermatological:  Skin Normal    Psych:  Psychiatric Normal                     Anesthesia Plan  Type of  Anesthesia, risks & benefits discussed:    Anesthesia Type: Gen Natural Airway  Intra-op Monitoring Plan: Standard ASA Monitors  Post Op Pain Control Plan: multimodal analgesia  Induction:  IV  Informed Consent: Informed consent signed with the Patient and all parties understand the risks and agree with anesthesia plan.  All questions answered.   ASA Score: 1  Day of Surgery Review of History & Physical: H&P Update referred to the surgeon/provider.    Ready For Surgery From Anesthesia Perspective.     .

## 2024-12-13 ENCOUNTER — ANESTHESIA (OUTPATIENT)
Dept: ENDOSCOPY | Facility: HOSPITAL | Age: 34
End: 2024-12-13
Payer: COMMERCIAL

## 2024-12-13 ENCOUNTER — HOSPITAL ENCOUNTER (OUTPATIENT)
Facility: HOSPITAL | Age: 34
Discharge: HOME | End: 2024-12-13
Attending: INTERNAL MEDICINE | Admitting: INTERNAL MEDICINE
Payer: COMMERCIAL

## 2024-12-13 VITALS
TEMPERATURE: 99 F | BODY MASS INDEX: 24.11 KG/M2 | HEIGHT: 66 IN | DIASTOLIC BLOOD PRESSURE: 58 MMHG | HEART RATE: 74 BPM | SYSTOLIC BLOOD PRESSURE: 111 MMHG | OXYGEN SATURATION: 100 % | WEIGHT: 150 LBS | RESPIRATION RATE: 18 BRPM

## 2024-12-13 DIAGNOSIS — R10.13 EPIGASTRIC ABDOMINAL PAIN: Primary | ICD-10-CM

## 2024-12-13 LAB
B-HCG UR QL: NEGATIVE
CTP QC/QA: YES

## 2024-12-13 PROCEDURE — 25000003 PHARM REV CODE 250: Performed by: INTERNAL MEDICINE

## 2024-12-13 PROCEDURE — 99900035 HC TECH TIME PER 15 MIN (STAT)

## 2024-12-13 PROCEDURE — 43239 EGD BIOPSY SINGLE/MULTIPLE: CPT | Mod: ,,, | Performed by: INTERNAL MEDICINE

## 2024-12-13 PROCEDURE — 88305 TISSUE EXAM BY PATHOLOGIST: CPT | Mod: 59 | Performed by: PATHOLOGY

## 2024-12-13 PROCEDURE — 27201012 HC FORCEPS, HOT/COLD, DISP: Performed by: INTERNAL MEDICINE

## 2024-12-13 PROCEDURE — 37000009 HC ANESTHESIA EA ADD 15 MINS: Performed by: INTERNAL MEDICINE

## 2024-12-13 PROCEDURE — 37000008 HC ANESTHESIA 1ST 15 MINUTES: Performed by: INTERNAL MEDICINE

## 2024-12-13 PROCEDURE — 63600175 PHARM REV CODE 636 W HCPCS: Performed by: NURSE ANESTHETIST, CERTIFIED REGISTERED

## 2024-12-13 PROCEDURE — 81025 URINE PREGNANCY TEST: CPT | Performed by: INTERNAL MEDICINE

## 2024-12-13 PROCEDURE — 63600175 PHARM REV CODE 636 W HCPCS: Mod: JG | Performed by: INTERNAL MEDICINE

## 2024-12-13 PROCEDURE — 43239 EGD BIOPSY SINGLE/MULTIPLE: CPT | Performed by: INTERNAL MEDICINE

## 2024-12-13 PROCEDURE — 94761 N-INVAS EAR/PLS OXIMETRY MLT: CPT

## 2024-12-13 RX ORDER — ONDANSETRON HYDROCHLORIDE 2 MG/ML
4 INJECTION, SOLUTION INTRAVENOUS ONCE
Status: COMPLETED | OUTPATIENT
Start: 2024-12-13 | End: 2024-12-13

## 2024-12-13 RX ORDER — PROPOFOL 10 MG/ML
VIAL (ML) INTRAVENOUS
Status: DISCONTINUED | OUTPATIENT
Start: 2024-12-13 | End: 2024-12-13

## 2024-12-13 RX ORDER — FENTANYL CITRATE 50 UG/ML
INJECTION, SOLUTION INTRAMUSCULAR; INTRAVENOUS
Status: DISCONTINUED | OUTPATIENT
Start: 2024-12-13 | End: 2024-12-13

## 2024-12-13 RX ORDER — PROPOFOL 10 MG/ML
VIAL (ML) INTRAVENOUS CONTINUOUS PRN
Status: DISCONTINUED | OUTPATIENT
Start: 2024-12-13 | End: 2024-12-13

## 2024-12-13 RX ORDER — CLONAZEPAM 0.5 MG/1
0.5 TABLET ORAL 2 TIMES DAILY PRN
COMMUNITY

## 2024-12-13 RX ORDER — SODIUM CHLORIDE 9 MG/ML
INJECTION, SOLUTION INTRAVENOUS CONTINUOUS
Status: DISCONTINUED | OUTPATIENT
Start: 2024-12-13 | End: 2024-12-13 | Stop reason: HOSPADM

## 2024-12-13 RX ORDER — LIDOCAINE HYDROCHLORIDE 20 MG/ML
INJECTION INTRAVENOUS
Status: DISCONTINUED | OUTPATIENT
Start: 2024-12-13 | End: 2024-12-13

## 2024-12-13 RX ADMIN — PROPOFOL 225 MCG/KG/MIN: 10 INJECTION, EMULSION INTRAVENOUS at 02:12

## 2024-12-13 RX ADMIN — FENTANYL CITRATE 50 MCG: 50 INJECTION, SOLUTION INTRAMUSCULAR; INTRAVENOUS at 02:12

## 2024-12-13 RX ADMIN — PROPOFOL 20 MG: 10 INJECTION, EMULSION INTRAVENOUS at 02:12

## 2024-12-13 RX ADMIN — LIDOCAINE HYDROCHLORIDE 100 MG: 20 INJECTION INTRAVENOUS at 02:12

## 2024-12-13 RX ADMIN — DESMOPRESSIN ACETATE 20 MCG: 4 SOLUTION INTRAVENOUS at 01:12

## 2024-12-13 RX ADMIN — GLYCOPYRROLATE 0.2 MG: 0.2 INJECTION, SOLUTION INTRAMUSCULAR; INTRAVENOUS at 02:12

## 2024-12-13 RX ADMIN — ONDANSETRON 4 MG: 2 INJECTION INTRAMUSCULAR; INTRAVENOUS at 02:12

## 2024-12-13 RX ADMIN — PROPOFOL 120 MG: 10 INJECTION, EMULSION INTRAVENOUS at 02:12

## 2024-12-13 RX ADMIN — SODIUM CHLORIDE: 0.9 INJECTION, SOLUTION INTRAVENOUS at 02:12

## 2024-12-13 NOTE — H&P
Short Stay Endoscopy History and Physical    PCP - No, Primary Doctor    Procedure - EGD  Sedation: GA  ASA - per anesthesia  Mallampati - per anesthesia  History of Anesthesia problems - no  Family history Anesthesia problems -  no     HPI:  This is a 33 y.o. female here for evaluation of : Epigastric abdominal pain    Reflux - no  Dysphagia - no  Abdominal pain - yes  Diarrhea - no    ROS:  Constitutional: No fevers, chills, No weight loss  ENT: No allergies  CV: No chest pain  Pulm: No cough, No shortness of breath  Ophtho: No vision changes  GI: see HPI  Medical History:  has a past medical history of Allergy and Depression.    Surgical History:  has a past surgical history that includes [place] tooth extraction and Sinus surgery.    Family History: Family history is unknown by patient.. Otherwise no colon cancer, inflammatory bowel disease, or GI malignancies.    Social History:  reports that she has never smoked. She has never used smokeless tobacco. She reports current alcohol use.    Review of patient's allergies indicates:  No Known Allergies    Medications:   Medications Prior to Admission   Medication Sig Dispense Refill Last Dose/Taking    AUROVELA 24 FE 1 mg-20 mcg (24)/75 mg (4) per tablet Take 1 tablet by mouth once daily.       DUPIXENT  mg/2 mL PnIj Inject into the skin.       FLUoxetine 20 MG capsule Take 20 mg by mouth once daily.       ondansetron (ZOFRAN-ODT) 4 MG TbDL Take 1 tablet (4 mg total) by mouth every 8 (eight) hours as needed (nausea). 30 tablet 0     pantoprazole (PROTONIX) 40 MG tablet Take 1 tablet (40 mg total) by mouth once daily. 30 tablet 11     sucralfate (CARAFATE) 1 gram tablet Take 1 tablet (1 g total) by mouth 4 (four) times daily as needed (dyspepsia). 30 tablet 0        Objective Findings:    Vital Signs: Per nursing notes.    Physical Exam:  General Appearance: Well appearing in no acute distress  Head:   Normocephalic, without obvious abnormality  Eyes:    No  scleral icterus  Airway: Open  Neck: No restriction in mobility  Lungs: CTA bilaterally in anterior and posterior fields, no wheezes, no crackles.  Heart:  Regular rate and rhythm, S1, S2 normal, no murmurs heard  Abdomen: Soft, non tender, non distended      Labs:  Lab Results   Component Value Date    ALT 24 08/31/2022    AST 22 08/31/2022     08/31/2022    K 4.6 08/31/2022    CREATININE 0.67 08/31/2022    BUN 13 08/31/2022    CO2 29 08/31/2022         I have explained the risks and benefits of endoscopy procedures to the patient including but not limited to bleeding, perforation, infection, and death.    Thank you so much for allowing me to participate in the care of Jayleen Mathis MD

## 2024-12-13 NOTE — PROVATION PATIENT INSTRUCTIONS
Discharge Summary/Instructions after an Endoscopic Procedure  Patient Name: Jayleen Tejada Early  Patient MRN: [id number]  Patient YOB: 1990 Friday, December 13, 2024  Timothy Mathis MD  Dear patient,  As a result of recent federal legislation (The Federal Cures Act), you may   receive lab or pathology results from your procedure in your [facility]   account before your physician is able to contact you. Your physician or   their representative will relay the results to you with their   recommendations at their soonest availability.  Thank you,  RESTRICTIONS:  During your procedure today, you received medications for sedation.  These   medications may affect your judgment, balance and coordination.  Therefore,   for 24 hours, you have the following restrictions:   - DO NOT drive a car, operate machinery, make legal/financial decisions,   sign important papers or drink alcohol.    ACTIVITY:  Today: no heavy lifting, straining or running due to procedural   sedation/anesthesia.  The following day: return to full activity including work.  DIET:  Eat and drink normally unless instructed otherwise.     TREATMENT FOR COMMON SIDE EFFECTS:  - Mild abdominal pain, nausea, belching, bloating or excessive gas:  rest,   eat lightly and use a heating pad.  - Sore Throat: treat with throat lozenges and/or gargle with warm salt   water.  - Because air was used during the procedure, expelling large amounts of air   from your rectum or belching is normal.  - If a bowel prep was taken, you may not have a bowel movement for 1-3 days.    This is normal.  SYMPTOMS TO WATCH FOR AND REPORT TO YOUR PHYSICIAN:  1. Abdominal pain or bloating, other than gas cramps.  2. Chest pain.  3. Back pain.  4. Signs of infection such as: chills or fever occurring within 24 hours   after the procedure.  5. Rectal bleeding, which would show as bright red, maroon, or black stools.   (A tablespoon of blood from the rectum is not serious,  especially if   hemorrhoids are present.)  6. Vomiting.  7. Weakness or dizziness.  GO DIRECTLY TO THE NEAREST EMERGENCY ROOM IF YOU HAVE ANY OF THE FOLLOWING:      Difficulty breathing              Chills and/or fever over 101 F   Persistent vomiting and/or vomiting blood   Severe abdominal pain   Severe chest pain   Black, tarry stools   Bleeding- more than one tablespoon   Any other symptom or condition that you feel may need urgent attention  Your doctor recommends these additional instructions:  If any biopsies were taken, your doctors clinic will contact you in 1 to 2   weeks with any results.  - Patient has a contact number available for emergencies.  The signs and   symptoms of potential delayed complications were discussed with the   patient.  Return to normal activities tomorrow.  Written discharge   instructions were provided to the patient.   - Discharge patient to home.   - Resume previous diet.   - Continue present medications.   - Await pathology results.   For questions, problems or results please call your physician - Timothy Mathis MD at Work:  [phone].  [facility] [place], EMERGENCY ROOM PHONE NUMBER: [phone]  IF A COMPLICATION OR EMERGENCY SITUATION ARISES AND YOU ARE UNABLE TO REACH   YOUR PHYSICIAN - GO DIRECTLY TO THE EMERGENCY ROOM.  Timothy Mathis MD  12/13/2024 2:48:40 PM  This report has been verified and signed electronically.  Dear patient,  As a result of recent federal legislation (The Federal Cures Act), you may   receive lab or pathology results from your procedure in your [facility]   account before your physician is able to contact you. Your physician or   their representative will relay the results to you with their   recommendations at their soonest availability.  Thank you,  PROVATION

## 2024-12-13 NOTE — TRANSFER OF CARE
"Anesthesia Transfer of Care Note    Patient: Jayleen Gold    Procedure(s) Performed: Procedure(s) (LRB):  EGD (ESOPHAGOGASTRODUODENOSCOPY) (N/A)    Patient location: PACU    Anesthesia Type: general    Transport from OR: Transported from OR on room air with adequate spontaneous ventilation    Post pain: adequate analgesia    Post assessment: no apparent anesthetic complications and tolerated procedure well    Post vital signs: stable    Level of consciousness: alert and awake    Nausea/Vomiting: no nausea/vomiting    Complications: none    Transfer of care protocol was followed      Last vitals: Visit Vitals  /69 (BP Location: Left arm, Patient Position: Lying)   Pulse 84   Temp 37 °C (98.6 °F) (Temporal)   Resp 20   Ht 5' 6" (1.676 m)   Wt 68 kg (150 lb)   LMP 11/27/2024 (Exact Date)   SpO2 100%   Breastfeeding No   BMI 24.21 kg/m²     "

## 2024-12-13 NOTE — PLAN OF CARE
IV inserted, patient has Von Willerbrand Disease, Type 1.  Notified charge nurse.  New orders noted for desmopressin.

## 2024-12-13 NOTE — ANESTHESIA POSTPROCEDURE EVALUATION
Anesthesia Post Evaluation    Patient: Jayleen Gold    Procedure(s) Performed: Procedure(s) (LRB):  EGD (ESOPHAGOGASTRODUODENOSCOPY) (N/A)    Final Anesthesia Type: general      Patient location during evaluation: PACU  Patient participation: Yes- Able to Participate  Level of consciousness: responds to stimulation  Post-procedure vital signs: reviewed and stable  Pain management: adequate  Airway patency: patent  JODI mitigation strategies: Multimodal analgesia  PONV status at discharge: No PONV  Anesthetic complications: no      Cardiovascular status: hemodynamically stable  Respiratory status: spontaneous ventilation and room air  Hydration status: euvolemic  Follow-up not needed.              Vitals Value Taken Time   /58 12/13/24 1503   Temp 37 °C (98.6 °F) 12/13/24 1450   Pulse 67 12/13/24 1504   Resp 23 12/13/24 1504   SpO2 100 % 12/13/24 1504   Vitals shown include unfiled device data.      No case tracking events are documented in the log.      Pain/Oracio Score: Oracio Score: 10 (12/13/2024  3:03 PM)

## 2024-12-16 LAB
FINAL PATHOLOGIC DIAGNOSIS: NORMAL
GROSS: NORMAL
Lab: NORMAL

## 2024-12-17 ENCOUNTER — TELEPHONE (OUTPATIENT)
Dept: GASTROENTEROLOGY | Facility: CLINIC | Age: 34
End: 2024-12-17
Payer: COMMERCIAL

## 2024-12-17 NOTE — TELEPHONE ENCOUNTER
----- Message from Timothy Mathis MD sent at 12/17/2024  7:40 AM CST -----  Nothing concerning on biopsies.